# Patient Record
Sex: MALE | Race: WHITE | Employment: FULL TIME | URBAN - METROPOLITAN AREA
[De-identification: names, ages, dates, MRNs, and addresses within clinical notes are randomized per-mention and may not be internally consistent; named-entity substitution may affect disease eponyms.]

---

## 2017-02-08 PROCEDURE — 87081 CULTURE SCREEN ONLY: CPT | Performed by: FAMILY MEDICINE

## 2017-02-08 NOTE — PROGRESS NOTES
Haley Torres is a 46year old male here to discuss anxiety and cold symptoms. On lexapro for control. Pt has been stressed with work and school. Medicine is helping   Pt and wife happy  Pt commuting to [facility].    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 The patient is asked to return in 6 months or sooner if needed.

## 2017-02-16 ENCOUNTER — HOSPITAL ENCOUNTER (OUTPATIENT)
Dept: GENERAL RADIOLOGY | Facility: HOSPITAL | Age: 42
Discharge: HOME | End: 2017-02-16
Attending: FAMILY MEDICINE
Payer: COMMERCIAL

## 2017-02-16 ENCOUNTER — OFFICE VISIT (OUTPATIENT)
Dept: FAMILY MEDICINE CLINIC | Facility: CLINIC | Age: 42
End: 2017-02-16

## 2017-02-16 VITALS
DIASTOLIC BLOOD PRESSURE: 82 MMHG | OXYGEN SATURATION: 97 % | SYSTOLIC BLOOD PRESSURE: 122 MMHG | RESPIRATION RATE: 16 BRPM | HEART RATE: 71 BPM

## 2017-02-16 DIAGNOSIS — M25.511 ARTHRALGIA OF RIGHT ACROMIOCLAVICULAR JOINT: ICD-10-CM

## 2017-02-16 DIAGNOSIS — J18.9 PNEUMONIA OF LEFT LOWER LOBE DUE TO INFECTIOUS ORGANISM: Primary | ICD-10-CM

## 2017-02-16 DIAGNOSIS — J18.9 PNEUMONIA OF LEFT LOWER LOBE DUE TO INFECTIOUS ORGANISM: ICD-10-CM

## 2017-02-16 PROCEDURE — 71020 XR CHEST PA + LAT CHEST (CPT=71020): CPT

## 2017-02-16 PROCEDURE — 73050 X-RAY EXAM OF SHOULDERS: CPT

## 2017-02-16 PROCEDURE — 99214 OFFICE O/P EST MOD 30 MIN: CPT | Performed by: FAMILY MEDICINE

## 2017-02-16 RX ORDER — AZITHROMYCIN 250 MG/1
TABLET, FILM COATED ORAL
Qty: 6 TABLET | Refills: 0 | Status: SHIPPED | OUTPATIENT
Start: 2017-02-16 | End: 2017-03-24

## 2017-02-16 RX ORDER — CEFDINIR 300 MG/1
300 CAPSULE ORAL 2 TIMES DAILY
Qty: 20 CAPSULE | Refills: 0 | Status: SHIPPED | OUTPATIENT
Start: 2017-02-16 | End: 2017-02-26

## 2017-02-17 NOTE — PROGRESS NOTES
Gerry Love is a 38year old male here to discuss continued right shoulder pain and persistent respiratory symptoms    Pt has been sick for over one week  + c/c   No fever  Ear pain worse  + sick contacts  No otc meds     Pt has been seeing a chiro for ri lichenoid inflammation    • Eczema 2/08   • Multiple allergies    • E coli infection 2/06   • Food allergy      chicken, turkey, eggs causes throat to swell   • Nostril infection 8/06     right, with cellulitis, external redness, swelling and tenderness the plan. The patient is asked to return in 6 months or sooner if needed.

## 2017-02-24 ENCOUNTER — OFFICE VISIT (OUTPATIENT)
Dept: FAMILY MEDICINE CLINIC | Facility: CLINIC | Age: 42
End: 2017-02-24

## 2017-02-24 VITALS
DIASTOLIC BLOOD PRESSURE: 78 MMHG | RESPIRATION RATE: 18 BRPM | TEMPERATURE: 98 F | OXYGEN SATURATION: 96 % | HEART RATE: 71 BPM | HEIGHT: 68 IN | BODY MASS INDEX: 29.7 KG/M2 | WEIGHT: 196 LBS | SYSTOLIC BLOOD PRESSURE: 120 MMHG

## 2017-02-24 DIAGNOSIS — R06.2 WHEEZING: ICD-10-CM

## 2017-02-24 DIAGNOSIS — R05.9 COUGH: Primary | ICD-10-CM

## 2017-02-24 PROCEDURE — 99213 OFFICE O/P EST LOW 20 MIN: CPT | Performed by: PHYSICIAN ASSISTANT

## 2017-02-24 PROCEDURE — 94640 AIRWAY INHALATION TREATMENT: CPT | Performed by: PHYSICIAN ASSISTANT

## 2017-02-24 RX ORDER — PREDNISONE 20 MG/1
TABLET ORAL
Qty: 10 TABLET | Refills: 0 | Status: SHIPPED | OUTPATIENT
Start: 2017-02-24 | End: 2017-09-13

## 2017-02-24 RX ORDER — IPRATROPIUM BROMIDE AND ALBUTEROL SULFATE 2.5; .5 MG/3ML; MG/3ML
3 SOLUTION RESPIRATORY (INHALATION) ONCE
Status: COMPLETED | OUTPATIENT
Start: 2017-02-24 | End: 2017-02-24

## 2017-02-24 RX ORDER — FLUTICASONE PROPIONATE 50 MCG
1 SPRAY, SUSPENSION (ML) NASAL DAILY
Qty: 1 BOTTLE | Refills: 1 | Status: SHIPPED | OUTPATIENT
Start: 2017-02-24 | End: 2017-03-26

## 2017-02-24 RX ORDER — ALBUTEROL SULFATE 2.5 MG/3ML
2.5 SOLUTION RESPIRATORY (INHALATION) EVERY 4 HOURS PRN
Qty: 70 CONTAINER | Refills: 0 | Status: SHIPPED | OUTPATIENT
Start: 2017-02-24

## 2017-02-24 RX ADMIN — IPRATROPIUM BROMIDE AND ALBUTEROL SULFATE 3 ML: 2.5; .5 SOLUTION RESPIRATORY (INHALATION) at 08:42:00

## 2017-02-24 NOTE — PROGRESS NOTES
HPI:   Loni is a 38year old male who presents for follow up of diagnosed pneumonia. Seen 2/16/17; diagnosed with left lower lobe pneumonia and treated with zpak and cefdinir. Had chest xray which was unremarkable.  Pt states he only has taken the c 5/07     right, symptomatic recurrent, s/p lithotripsy   • Staph infection 8/20/09     of wound on the leg with cellulitis.  s/p I & D   • Urinary bladder stone 8/21/09     small stone   • Inguinal hernia 8/21/09     development of small right inguinal severiano improved  CV: normal S1S2, RRR without murmur     ASSESSMENT AND PLAN:   1. Cough  duoneb treatment given today in office-wheezing improved. Chest xray results reviewed and discussed. Prednisone 40 mg daily for 5 days. Take with food.   Albuterol nebulize

## 2017-03-24 ENCOUNTER — OFFICE VISIT (OUTPATIENT)
Dept: FAMILY MEDICINE CLINIC | Facility: CLINIC | Age: 42
End: 2017-03-24

## 2017-03-24 VITALS
WEIGHT: 192 LBS | HEART RATE: 76 BPM | TEMPERATURE: 99 F | BODY MASS INDEX: 29 KG/M2 | RESPIRATION RATE: 18 BRPM | DIASTOLIC BLOOD PRESSURE: 80 MMHG | SYSTOLIC BLOOD PRESSURE: 126 MMHG

## 2017-03-24 DIAGNOSIS — M25.511 CHRONIC RIGHT SHOULDER PAIN: ICD-10-CM

## 2017-03-24 DIAGNOSIS — G89.29 CHRONIC RIGHT SHOULDER PAIN: ICD-10-CM

## 2017-03-24 DIAGNOSIS — F41.9 ANXIETY: ICD-10-CM

## 2017-03-24 DIAGNOSIS — H65.115 RECURRENT SUBACUTE ALLERGIC OTITIS MEDIA OF LEFT EAR: Primary | ICD-10-CM

## 2017-03-24 PROCEDURE — 99214 OFFICE O/P EST MOD 30 MIN: CPT | Performed by: FAMILY MEDICINE

## 2017-03-24 RX ORDER — CEFDINIR 300 MG/1
300 CAPSULE ORAL 2 TIMES DAILY
Qty: 20 CAPSULE | Refills: 0 | Status: SHIPPED | OUTPATIENT
Start: 2017-03-24 | End: 2017-04-03

## 2017-03-24 RX ORDER — ALPRAZOLAM 0.5 MG/1
0.5 TABLET ORAL NIGHTLY PRN
Qty: 30 TABLET | Refills: 0 | Status: SHIPPED | OUTPATIENT
Start: 2017-03-24 | End: 2017-09-13

## 2017-03-24 RX ORDER — PREDNISONE 20 MG/1
40 TABLET ORAL DAILY
Qty: 8 TABLET | Refills: 0 | Status: SHIPPED | OUTPATIENT
Start: 2017-03-24 | End: 2017-03-28

## 2017-03-24 NOTE — PROGRESS NOTES
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 Dispense: 30 tablet; Refill: 0    3.  Chronic right shoulder pain    - PHYSICAL THERAPY - INTERNAL ([facility])    The patient indicates understanding of these issues and agrees to the pl

## 2017-04-14 RX ORDER — ESCITALOPRAM OXALATE 10 MG/1
TABLET ORAL
Qty: 90 TABLET | Refills: 0 | Status: SHIPPED | OUTPATIENT
Start: 2017-04-14 | End: 2017-07-02

## 2017-05-25 ENCOUNTER — CHARTING TRANS (OUTPATIENT)
Dept: OTHER | Age: 42
End: 2017-05-25

## 2017-05-25 ENCOUNTER — IMAGING SERVICES (OUTPATIENT)
Dept: OTHER | Age: 42
End: 2017-05-25

## 2017-05-25 ASSESSMENT — PAIN SCALES - GENERAL: PAINLEVEL_OUTOF10: 10

## 2017-07-03 RX ORDER — ESCITALOPRAM OXALATE 10 MG/1
TABLET ORAL
Qty: 90 TABLET | Refills: 0 | Status: SHIPPED | OUTPATIENT
Start: 2017-07-03 | End: 2017-09-13

## 2017-09-13 ENCOUNTER — OFFICE VISIT (OUTPATIENT)
Dept: FAMILY MEDICINE CLINIC | Facility: CLINIC | Age: 42
End: 2017-09-13

## 2017-09-13 VITALS
TEMPERATURE: 98 F | DIASTOLIC BLOOD PRESSURE: 80 MMHG | SYSTOLIC BLOOD PRESSURE: 118 MMHG | WEIGHT: 196 LBS | RESPIRATION RATE: 18 BRPM | HEIGHT: 68 IN | OXYGEN SATURATION: 98 % | BODY MASS INDEX: 29.7 KG/M2 | HEART RATE: 80 BPM

## 2017-09-13 DIAGNOSIS — Z91.018 NUT ALLERGY: ICD-10-CM

## 2017-09-13 DIAGNOSIS — F41.9 ANXIETY: ICD-10-CM

## 2017-09-13 DIAGNOSIS — Z00.00 ROUTINE MEDICAL EXAM: Primary | ICD-10-CM

## 2017-09-13 DIAGNOSIS — Z00.00 LABORATORY EXAM ORDERED AS PART OF ROUTINE GENERAL MEDICAL EXAMINATION: ICD-10-CM

## 2017-09-13 PROCEDURE — 99396 PREV VISIT EST AGE 40-64: CPT | Performed by: PHYSICIAN ASSISTANT

## 2017-09-13 RX ORDER — ALBUTEROL SULFATE 90 UG/1
1 AEROSOL, METERED RESPIRATORY (INHALATION) EVERY 6 HOURS PRN
Qty: 1 INHALER | Refills: 1 | Status: SHIPPED | OUTPATIENT
Start: 2017-09-13 | End: 2017-12-07

## 2017-09-13 RX ORDER — ESCITALOPRAM OXALATE 10 MG/1
10 TABLET ORAL
Qty: 90 TABLET | Refills: 1 | Status: SHIPPED | OUTPATIENT
Start: 2017-09-13 | End: 2018-01-22

## 2017-09-13 RX ORDER — ALPRAZOLAM 0.5 MG/1
0.5 TABLET ORAL NIGHTLY PRN
Qty: 30 TABLET | Refills: 0 | Status: SHIPPED | OUTPATIENT
Start: 2017-09-13 | End: 2017-12-07

## 2017-09-13 RX ORDER — EPINEPHRINE 0.3 MG/.3ML
0.3 INJECTION SUBCUTANEOUS AS NEEDED
Qty: 1 EACH | Refills: 1 | Status: SHIPPED | OUTPATIENT
Start: 2017-09-13 | End: 2018-12-15

## 2017-09-13 NOTE — PROGRESS NOTES
Carlos Maki is a 38year old male who presents for a complete physical exam.   HPI:   Pt complains of needing physical.   Pt is not fasting today for lab work. Also here to follow up on lexapro. Tolerating well without side effects.  Symptoms are stabl development of small right inguinal hernia   • Multiple allergies    • Nephrolithiasis 5/07    right, symptomatic recurrent, s/p lithotripsy   • Nostril infection 8/06    right, with cellulitis, external redness, swelling and tenderness   • Other specified denies chest pain, pressure or palpitations  GI: denies abdominal pain, denies heartburn, denies chronic diarrhea or constipation  : denies nocturia or changes in stream, denies erectile dysfunction  MS: denies back pain, arthralgias or myalgias  NEURO: T4; Future  - VITAMIN D, 25-HYDROXY; Future    3. Anxiety  Refill escitalopram and alprazolam  Symptoms are stable  - escitalopram 10 MG Oral Tab; Take 1 tablet (10 mg total) by mouth once daily. Dispense: 90 tablet;  Refill: 1  - ALPRAZolam (XANAX) 0.5 MG

## 2017-10-12 RX ORDER — ESCITALOPRAM OXALATE 10 MG/1
TABLET ORAL
Qty: 90 TABLET | Refills: 0 | OUTPATIENT
Start: 2017-10-12

## 2017-10-26 ENCOUNTER — TELEPHONE (OUTPATIENT)
Dept: FAMILY MEDICINE CLINIC | Facility: CLINIC | Age: 42
End: 2017-10-26

## 2017-10-26 NOTE — TELEPHONE ENCOUNTER
LMTCB for patient to schedule appointment with LE to review lab results. Copy of labs sent to scan and copy put in LE appointment folder.

## 2017-12-07 ENCOUNTER — OFFICE VISIT (OUTPATIENT)
Dept: FAMILY MEDICINE CLINIC | Facility: CLINIC | Age: 42
End: 2017-12-07

## 2017-12-07 VITALS
BODY MASS INDEX: 29.7 KG/M2 | SYSTOLIC BLOOD PRESSURE: 122 MMHG | WEIGHT: 196 LBS | DIASTOLIC BLOOD PRESSURE: 68 MMHG | HEART RATE: 74 BPM | TEMPERATURE: 99 F | HEIGHT: 68 IN | RESPIRATION RATE: 18 BRPM | OXYGEN SATURATION: 98 %

## 2017-12-07 DIAGNOSIS — E78.00 ELEVATED CHOLESTEROL: ICD-10-CM

## 2017-12-07 DIAGNOSIS — J45.20 MILD INTERMITTENT ASTHMA WITHOUT COMPLICATION: ICD-10-CM

## 2017-12-07 DIAGNOSIS — G47.09 OTHER INSOMNIA: Primary | ICD-10-CM

## 2017-12-07 DIAGNOSIS — F41.9 ANXIETY: ICD-10-CM

## 2017-12-07 PROCEDURE — 99214 OFFICE O/P EST MOD 30 MIN: CPT | Performed by: FAMILY MEDICINE

## 2017-12-07 PROCEDURE — 90471 IMMUNIZATION ADMIN: CPT | Performed by: FAMILY MEDICINE

## 2017-12-07 PROCEDURE — 90686 IIV4 VACC NO PRSV 0.5 ML IM: CPT | Performed by: FAMILY MEDICINE

## 2017-12-07 RX ORDER — TRAZODONE HYDROCHLORIDE 50 MG/1
50 TABLET ORAL NIGHTLY
Qty: 30 TABLET | Refills: 0 | Status: SHIPPED | OUTPATIENT
Start: 2017-12-07 | End: 2018-02-15

## 2017-12-07 RX ORDER — ALPRAZOLAM 0.5 MG/1
0.5 TABLET ORAL NIGHTLY PRN
Qty: 30 TABLET | Refills: 0 | Status: SHIPPED | OUTPATIENT
Start: 2017-12-07 | End: 2018-02-15

## 2017-12-07 RX ORDER — ALBUTEROL SULFATE 90 UG/1
1 AEROSOL, METERED RESPIRATORY (INHALATION) EVERY 6 HOURS PRN
Qty: 1 INHALER | Refills: 1 | Status: SHIPPED | OUTPATIENT
Start: 2017-12-07 | End: 2018-12-15

## 2017-12-07 NOTE — PROGRESS NOTES
Spencer Sepulveda is a 38year old male here to discuss labs and anxiety. HPI:     Pt will use xanax to help with sleep; pt has a cpap. Did discuss long term side effects of benzos  Willing to try something new.     Cholesterol is elevated; pt has not been wor symptomatic recurrent, s/p lithotripsy   • Nostril infection 8/06    right, with cellulitis, external redness, swelling and tenderness   • Other specified conditions of the tongue 9/07    s/p right lateral tongue partial glossectomy for epithelial dysplasi 30 tablet; Refill: 0    3. Mild intermittent asthma without complication    - Albuterol Sulfate HFA (PROAIR HFA) 108 (90 Base) MCG/ACT Inhalation Aero Soln; Inhale 1 puff into the lungs every 6 (six) hours as needed for Wheezing. Dispense: 1 Inhaler;  Refi

## 2018-01-22 DIAGNOSIS — F41.9 ANXIETY: ICD-10-CM

## 2018-01-22 RX ORDER — ESCITALOPRAM OXALATE 10 MG/1
10 TABLET ORAL
Qty: 90 TABLET | Refills: 0 | Status: SHIPPED | OUTPATIENT
Start: 2018-01-22 | End: 2018-04-15

## 2018-02-14 NOTE — PROGRESS NOTES
HPI:   Loni is a 41year old male who c/o medication follow up. Seen by Dr Silvia Hurtado 12/2017; trazodone was prescribed along with refills of alprazolam and escitalopram. Needs refills on his medications.  Traveling to [facility], and [facility] Take 1 tablet (0.5 mg total) by mouth nightly as needed for Sleep or Anxiety. Dispense: 30 tablet; Refill: 0    2. Other insomnia  Trazodone for sleep. Do not mix alprazolam and trazodone.   - TraZODone HCl 50 MG Oral Tab;  Take 1 tablet (50 mg total) by

## 2018-03-19 DIAGNOSIS — G47.09 OTHER INSOMNIA: ICD-10-CM

## 2018-03-20 RX ORDER — TRAZODONE HYDROCHLORIDE 50 MG/1
TABLET ORAL
Qty: 30 TABLET | Refills: 0 | Status: SHIPPED | OUTPATIENT
Start: 2018-03-20 | End: 2018-04-20

## 2018-04-15 DIAGNOSIS — F41.9 ANXIETY: ICD-10-CM

## 2018-04-16 RX ORDER — ESCITALOPRAM OXALATE 10 MG/1
TABLET ORAL
Qty: 30 TABLET | Refills: 0 | Status: SHIPPED | OUTPATIENT
Start: 2018-04-16 | End: 2018-05-18

## 2018-04-20 DIAGNOSIS — G47.09 OTHER INSOMNIA: ICD-10-CM

## 2018-04-20 DIAGNOSIS — F41.9 ANXIETY: ICD-10-CM

## 2018-04-23 RX ORDER — TRAZODONE HYDROCHLORIDE 50 MG/1
TABLET ORAL
Qty: 30 TABLET | Refills: 0 | Status: SHIPPED | OUTPATIENT
Start: 2018-04-23 | End: 2018-12-15

## 2018-04-23 RX ORDER — ALPRAZOLAM 0.5 MG/1
TABLET ORAL
Qty: 30 TABLET | Refills: 0 | Status: SHIPPED
Start: 2018-04-23 | End: 2018-06-08

## 2018-05-18 DIAGNOSIS — F41.9 ANXIETY: ICD-10-CM

## 2018-05-18 RX ORDER — ESCITALOPRAM OXALATE 5 MG/1
TABLET ORAL
Qty: 90 TABLET | Refills: 0 | Status: SHIPPED | OUTPATIENT
Start: 2018-05-18 | End: 2018-06-08

## 2018-05-20 RX ORDER — ESCITALOPRAM OXALATE 10 MG/1
TABLET ORAL
Qty: 30 TABLET | Refills: 0 | Status: SHIPPED | OUTPATIENT
Start: 2018-05-20 | End: 2018-06-08

## 2018-06-07 NOTE — PROGRESS NOTES
HPI:   Loni is a 41year old male who presents for sinus symptoms for  4  days. Patient reports sore throat, congestion, ear pain, denies fever, denies sinus pain. Has taken ibuprofen over the counter, last dose 8 AM today.  Ears were plugged flying Diarrhea 9/11   • E coli infection 2/06   • Eczema 2/08   • EE (eosinophilic esophagitis) 7/62/8656   • Enlargement of lymph nodes 9/11/09   • Food allergy     chicken, turkey, eggs causes throat to swell   • Gastroenteritis 20/06   • H/O lymph node biopsy +mucosal inflammation and erythema, posterior pharynx with mild erythema and cobblestoning, uvula midline, no trismus, no drooling, no stridor, no sinus tenderness  NECK: supple, +anterior cervical adenopathy  LUNGS: CTA, easy breathing, no cough  CV: norm

## 2018-06-08 ENCOUNTER — OFFICE VISIT (OUTPATIENT)
Dept: FAMILY MEDICINE CLINIC | Facility: CLINIC | Age: 43
End: 2018-06-08

## 2018-06-08 VITALS
OXYGEN SATURATION: 99 % | TEMPERATURE: 98 F | RESPIRATION RATE: 16 BRPM | HEART RATE: 80 BPM | DIASTOLIC BLOOD PRESSURE: 72 MMHG | SYSTOLIC BLOOD PRESSURE: 128 MMHG

## 2018-06-08 DIAGNOSIS — J02.9 SORE THROAT: ICD-10-CM

## 2018-06-08 DIAGNOSIS — R09.82 POST-NASAL DRIP: ICD-10-CM

## 2018-06-08 DIAGNOSIS — H66.92 ACUTE INFECTION OF LEFT EAR: Primary | ICD-10-CM

## 2018-06-08 DIAGNOSIS — F41.9 ANXIETY: ICD-10-CM

## 2018-06-08 PROCEDURE — 99213 OFFICE O/P EST LOW 20 MIN: CPT | Performed by: PHYSICIAN ASSISTANT

## 2018-06-08 PROCEDURE — 87880 STREP A ASSAY W/OPTIC: CPT | Performed by: PHYSICIAN ASSISTANT

## 2018-06-08 RX ORDER — ESCITALOPRAM OXALATE 10 MG/1
10 TABLET ORAL
Qty: 30 TABLET | Refills: 2 | Status: SHIPPED | OUTPATIENT
Start: 2018-06-08 | End: 2018-08-11

## 2018-06-08 RX ORDER — ESCITALOPRAM OXALATE 5 MG/1
TABLET ORAL
Qty: 30 TABLET | Refills: 2 | Status: SHIPPED | OUTPATIENT
Start: 2018-06-08 | End: 2018-08-11

## 2018-06-08 RX ORDER — AMOXICILLIN AND CLAVULANATE POTASSIUM 875; 125 MG/1; MG/1
1 TABLET, FILM COATED ORAL 2 TIMES DAILY
Qty: 20 TABLET | Refills: 0 | Status: SHIPPED | OUTPATIENT
Start: 2018-06-08 | End: 2018-06-18

## 2018-06-08 RX ORDER — ALPRAZOLAM 0.5 MG/1
TABLET ORAL
Qty: 30 TABLET | Refills: 0 | Status: SHIPPED | OUTPATIENT
Start: 2018-06-08 | End: 2018-08-11

## 2018-08-11 DIAGNOSIS — F41.9 ANXIETY: ICD-10-CM

## 2018-08-13 RX ORDER — ALPRAZOLAM 0.5 MG/1
TABLET ORAL
Qty: 30 TABLET | Refills: 0 | Status: SHIPPED | OUTPATIENT
Start: 2018-08-13 | End: 2018-12-15

## 2018-08-13 RX ORDER — ESCITALOPRAM OXALATE 5 MG/1
TABLET ORAL
Qty: 30 TABLET | Refills: 2 | Status: SHIPPED | OUTPATIENT
Start: 2018-08-13 | End: 2018-12-15

## 2018-08-13 RX ORDER — ESCITALOPRAM OXALATE 10 MG/1
10 TABLET ORAL
Qty: 30 TABLET | Refills: 2 | Status: SHIPPED | OUTPATIENT
Start: 2018-08-13 | End: 2018-12-15

## 2018-08-13 NOTE — TELEPHONE ENCOUNTER
From: Loni  Sent: 8/11/2018 8:28 AM CDT  Subject: Medication Renewal Request    Loni would like a refill of the following medications:     ALPRAZolam 0.5 MG Oral Tab Loreen Kayser, PA]     escitalopram 10 MG Oral Tab [Lisa Smart P

## 2018-11-03 VITALS
BODY MASS INDEX: 28.04 KG/M2 | HEART RATE: 70 BPM | HEIGHT: 68 IN | DIASTOLIC BLOOD PRESSURE: 78 MMHG | RESPIRATION RATE: 16 BRPM | TEMPERATURE: 98.9 F | WEIGHT: 185 LBS | SYSTOLIC BLOOD PRESSURE: 118 MMHG

## 2018-12-15 ENCOUNTER — TELEPHONE (OUTPATIENT)
Dept: FAMILY MEDICINE CLINIC | Facility: CLINIC | Age: 43
End: 2018-12-15

## 2018-12-15 DIAGNOSIS — G47.09 OTHER INSOMNIA: ICD-10-CM

## 2018-12-15 DIAGNOSIS — Z91.018 NUT ALLERGY: ICD-10-CM

## 2018-12-15 DIAGNOSIS — J45.20 MILD INTERMITTENT ASTHMA WITHOUT COMPLICATION: ICD-10-CM

## 2018-12-15 DIAGNOSIS — K20.0 EE (EOSINOPHILIC ESOPHAGITIS): ICD-10-CM

## 2018-12-15 DIAGNOSIS — F41.9 ANXIETY: ICD-10-CM

## 2018-12-17 RX ORDER — ESCITALOPRAM OXALATE 10 MG/1
TABLET ORAL
Qty: 30 TABLET | Refills: 1 | Status: SHIPPED | OUTPATIENT
Start: 2018-12-17 | End: 2019-10-03 | Stop reason: ALTCHOICE

## 2018-12-17 RX ORDER — ESCITALOPRAM OXALATE 5 MG/1
TABLET ORAL
Qty: 30 TABLET | Refills: 1 | Status: SHIPPED | OUTPATIENT
Start: 2018-12-17 | End: 2019-10-03 | Stop reason: ALTCHOICE

## 2018-12-17 RX ORDER — OMEPRAZOLE 20 MG/1
CAPSULE, DELAYED RELEASE ORAL
Qty: 90 CAPSULE | Refills: 0 | Status: SHIPPED | OUTPATIENT
Start: 2018-12-17 | End: 2020-01-06

## 2018-12-17 RX ORDER — TRAZODONE HYDROCHLORIDE 50 MG/1
50 TABLET ORAL NIGHTLY PRN
Qty: 30 TABLET | Refills: 0 | Status: SHIPPED | OUTPATIENT
Start: 2018-12-17 | End: 2020-01-06

## 2018-12-17 RX ORDER — ALPRAZOLAM 0.5 MG/1
TABLET ORAL
Qty: 30 TABLET | Refills: 0 | Status: SHIPPED | OUTPATIENT
Start: 2018-12-17 | End: 2020-01-06

## 2018-12-17 RX ORDER — EPINEPHRINE 0.3 MG/.3ML
0.3 INJECTION SUBCUTANEOUS AS NEEDED
Qty: 1 EACH | Refills: 1 | Status: SHIPPED | OUTPATIENT
Start: 2018-12-17 | End: 2021-10-06

## 2018-12-18 RX ORDER — ALBUTEROL SULFATE 90 UG/1
1 AEROSOL, METERED RESPIRATORY (INHALATION) EVERY 6 HOURS PRN
Qty: 1 INHALER | Refills: 0 | Status: SHIPPED | OUTPATIENT
Start: 2018-12-18 | End: 2019-01-12

## 2018-12-18 NOTE — TELEPHONE ENCOUNTER
Rx Request  Albuterol Sulfate HFA (PROAIR HFA) 108 (90 Base) MCG/ACT Inhalation Aero Soln    Disp:        1            R: 1      Associated Dx:Mild intermittent asthma without complication    Last Visit: 06/08/2018    Last Refilled:12/07/2017

## 2019-01-12 ENCOUNTER — TELEPHONE (OUTPATIENT)
Dept: FAMILY MEDICINE CLINIC | Facility: CLINIC | Age: 44
End: 2019-01-12

## 2019-01-12 DIAGNOSIS — J45.20 MILD INTERMITTENT ASTHMA WITHOUT COMPLICATION: ICD-10-CM

## 2019-01-12 RX ORDER — ALBUTEROL SULFATE 90 UG/1
1 AEROSOL, METERED RESPIRATORY (INHALATION) EVERY 6 HOURS PRN
Qty: 1 INHALER | Refills: 0 | Status: SHIPPED | OUTPATIENT
Start: 2019-01-12 | End: 2020-02-27

## 2019-01-12 NOTE — TELEPHONE ENCOUNTER
Pt is about 3 hours away from his home and will be gone for 2 days, pt forgot his albuterol inhaler at home and is requesting to please prescribe an additional one to a pharmacy close to him, pt will call back with the pharmacy information.  Please call and

## 2019-01-12 NOTE — TELEPHONE ENCOUNTER
Pt called back to provide the pharmacy information: [facility] [phone]  # [phone] fax # [phone].

## 2019-01-12 NOTE — TELEPHONE ENCOUNTER
See previous message pt left inhaler at home and out of town. Albuterol was just refilled 12/18/18. Script sent to [email or web address] in [place] [id number].

## 2019-03-19 ENCOUNTER — TELEPHONE (OUTPATIENT)
Dept: FAMILY MEDICINE CLINIC | Facility: CLINIC | Age: 44
End: 2019-03-19

## 2019-03-19 RX ORDER — OSELTAMIVIR PHOSPHATE 75 MG/1
75 CAPSULE ORAL DAILY
Qty: 10 CAPSULE | Refills: 0 | Status: SHIPPED | OUTPATIENT
Start: 2019-03-19 | End: 2019-09-09 | Stop reason: ALTCHOICE

## 2019-09-09 ENCOUNTER — HOSPITAL ENCOUNTER (OUTPATIENT)
Dept: GENERAL RADIOLOGY | Age: 44
Discharge: HOME | End: 2019-09-09
Attending: PHYSICIAN ASSISTANT
Payer: COMMERCIAL

## 2019-09-09 ENCOUNTER — OFFICE VISIT (OUTPATIENT)
Dept: FAMILY MEDICINE CLINIC | Facility: CLINIC | Age: 44
End: 2019-09-09
Payer: COMMERCIAL

## 2019-09-09 VITALS
BODY MASS INDEX: 27.13 KG/M2 | OXYGEN SATURATION: 99 % | DIASTOLIC BLOOD PRESSURE: 70 MMHG | HEIGHT: 68 IN | HEART RATE: 77 BPM | RESPIRATION RATE: 16 BRPM | WEIGHT: 179 LBS | TEMPERATURE: 98 F | SYSTOLIC BLOOD PRESSURE: 106 MMHG

## 2019-09-09 DIAGNOSIS — R21 RASH: ICD-10-CM

## 2019-09-09 DIAGNOSIS — S69.92XA HAND INJURY, LEFT, INITIAL ENCOUNTER: ICD-10-CM

## 2019-09-09 DIAGNOSIS — S69.92XA HAND INJURY, LEFT, INITIAL ENCOUNTER: Primary | ICD-10-CM

## 2019-09-09 PROCEDURE — 73130 X-RAY EXAM OF HAND: CPT | Performed by: PHYSICIAN ASSISTANT

## 2019-09-09 PROCEDURE — 99213 OFFICE O/P EST LOW 20 MIN: CPT | Performed by: PHYSICIAN ASSISTANT

## 2019-09-09 RX ORDER — TRIAMCINOLONE ACETONIDE 5 MG/G
CREAM TOPICAL
Qty: 30 G | Refills: 2 | Status: SHIPPED | OUTPATIENT
Start: 2019-09-09 | End: 2020-01-06

## 2019-09-09 NOTE — PROGRESS NOTES
HPI:    Patient ID: Loni is a 45year old male. HPI   Patient who is right hand dominant presents today for evaluation of left hand pain.  States yesterday he was putting the pool cover on and his left hand slipped as it was rotating around hitti albuterol sulfate (2.5 MG/3ML) 0.083% Inhalation Nebu Soln Take 3 mL (2.5 mg total) by nebulization every 4 (four) hours as needed for Wheezing.  Disp: 70 Container Rfl: 0   TraZODone HCl 50 MG Oral Tab Take 1 tablet (50 mg total) by mouth nightly as need through fourth fingers after injury that occurred yesterday. Exam findings as documented. Given the extent of bony tenderness and decreased range of motion will check x-ray to rule out fracture. Refer to Dr. Gary Swan if positive for fracture.   Wound

## 2019-10-03 ENCOUNTER — OFFICE VISIT (OUTPATIENT)
Dept: FAMILY MEDICINE CLINIC | Facility: CLINIC | Age: 44
End: 2019-10-03
Payer: COMMERCIAL

## 2019-10-03 VITALS
TEMPERATURE: 98 F | RESPIRATION RATE: 18 BRPM | SYSTOLIC BLOOD PRESSURE: 112 MMHG | WEIGHT: 173 LBS | BODY MASS INDEX: 26.22 KG/M2 | HEART RATE: 76 BPM | OXYGEN SATURATION: 98 % | DIASTOLIC BLOOD PRESSURE: 66 MMHG | HEIGHT: 68 IN

## 2019-10-03 DIAGNOSIS — L30.9 DERMATITIS: Primary | ICD-10-CM

## 2019-10-03 PROCEDURE — 99213 OFFICE O/P EST LOW 20 MIN: CPT | Performed by: INTERNAL MEDICINE

## 2019-10-03 RX ORDER — CLOTRIMAZOLE AND BETAMETHASONE DIPROPIONATE 10; .64 MG/G; MG/G
CREAM TOPICAL
Qty: 45 G | Refills: 0 | Status: SHIPPED | OUTPATIENT
Start: 2019-10-03 | End: 2020-01-06

## 2019-10-03 NOTE — PROGRESS NOTES
Trsiten Prasad is a 45year old male. HPI:   Here with recurrent rash on arms. Seen last month and treated with triamcinolone, pt states it never completely went away. Got worse when he returned to the gym and uses a machine where he rests his forearms. Multiple allergies    • Nephrolithiasis 5/07    right, symptomatic recurrent, s/p lithotripsy   • Nostril infection 8/06    right, with cellulitis, external redness, swelling and tenderness   • Other specified conditions of the tongue 9/07    s/p right lat

## 2020-01-03 ENCOUNTER — TELEPHONE (OUTPATIENT)
Dept: SCHEDULING | Age: 45
End: 2020-01-03

## 2020-01-03 ENCOUNTER — OFFICE VISIT (OUTPATIENT)
Dept: FAMILY MEDICINE CLINIC | Facility: CLINIC | Age: 45
End: 2020-01-03
Payer: COMMERCIAL

## 2020-01-03 VITALS
HEART RATE: 68 BPM | TEMPERATURE: 98 F | HEIGHT: 68 IN | BODY MASS INDEX: 25.01 KG/M2 | OXYGEN SATURATION: 99 % | WEIGHT: 165 LBS | DIASTOLIC BLOOD PRESSURE: 70 MMHG | SYSTOLIC BLOOD PRESSURE: 110 MMHG

## 2020-01-03 DIAGNOSIS — J01.00 ACUTE NON-RECURRENT MAXILLARY SINUSITIS: Primary | ICD-10-CM

## 2020-01-03 PROCEDURE — 99213 OFFICE O/P EST LOW 20 MIN: CPT | Performed by: FAMILY MEDICINE

## 2020-01-03 RX ORDER — BUPROPION HYDROCHLORIDE 200 MG/1
300 TABLET, EXTENDED RELEASE ORAL 2 TIMES DAILY
COMMUNITY
End: 2020-12-22

## 2020-01-03 RX ORDER — AMOXICILLIN AND CLAVULANATE POTASSIUM 875; 125 MG/1; MG/1
1 TABLET, FILM COATED ORAL 2 TIMES DAILY
Qty: 20 TABLET | Refills: 0 | Status: SHIPPED | OUTPATIENT
Start: 2020-01-03 | End: 2020-01-13

## 2020-01-03 NOTE — PATIENT INSTRUCTIONS
Take antibiotics with food and plenty of water. Eat yogurt or take probiotic daily. Make sure to finish the entire antibiotic treatment.   Increase fluids and rest.     Use OTC meds for comfort as needed--  Use Benadryl at bedtime to reduce drainage and

## 2020-01-04 NOTE — PROGRESS NOTES
CHIEF COMPLAINT:   Patient presents with:  Cold: congestion, sore throat, cough, fatigue x 5 days. HPI:   Loni is a 45year old male who presents for sinus congestion for  5  days. Symptoms have been worsening since onset.  Sinus congestion/pa MG/3ML) 0.083% Inhalation Nebu Soln Take 3 mL (2.5 mg total) by nebulization every 4 (four) hours as needed for Wheezing.  (Patient not taking: Reported on 1/3/2020 ) 70 Container 0      Past Medical History:   Diagnosis Date   • Acute nasopharyngitis (comm cancer) Other    • Other (Other) Father 76        hypothyroidism      Social History    Tobacco Use      Smoking status: Never Smoker      Smokeless tobacco: Never Used    Alcohol use: Yes      Comment: occasional    Drug use: No        REVIEW OF SYSTEMS: days.           Risks, benefits, side effects of medication addressed and explained. Patient Instructions   Take antibiotics with food and plenty of water. Eat yogurt or take probiotic daily. Make sure to finish the entire antibiotic treatment.   Inc

## 2020-01-06 ENCOUNTER — ANESTHESIA (OUTPATIENT)
Dept: SURGERY | Facility: HOSPITAL | Age: 45
End: 2020-01-06
Payer: COMMERCIAL

## 2020-01-06 ENCOUNTER — HOSPITAL ENCOUNTER (EMERGENCY)
Facility: HOSPITAL | Age: 45
Discharge: HOME | End: 2020-01-06
Attending: EMERGENCY MEDICINE
Payer: COMMERCIAL

## 2020-01-06 ENCOUNTER — ANESTHESIA EVENT (OUTPATIENT)
Dept: SURGERY | Facility: HOSPITAL | Age: 45
End: 2020-01-06
Payer: COMMERCIAL

## 2020-01-06 VITALS
BODY MASS INDEX: 25.31 KG/M2 | OXYGEN SATURATION: 93 % | TEMPERATURE: 98 F | RESPIRATION RATE: 21 BRPM | HEIGHT: 68 IN | DIASTOLIC BLOOD PRESSURE: 65 MMHG | HEART RATE: 72 BPM | WEIGHT: 167 LBS | SYSTOLIC BLOOD PRESSURE: 111 MMHG

## 2020-01-06 DIAGNOSIS — K22.2 ESOPHAGEAL OBSTRUCTION DUE TO FOOD IMPACTION: Primary | ICD-10-CM

## 2020-01-06 DIAGNOSIS — T18.9XXA FOREIGN BODY ALIMENTARY TRACT: ICD-10-CM

## 2020-01-06 DIAGNOSIS — T18.128A ESOPHAGEAL OBSTRUCTION DUE TO FOOD IMPACTION: Primary | ICD-10-CM
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
## 2020-01-06 PROCEDURE — 96375 TX/PRO/DX INJ NEW DRUG ADDON: CPT

## 2020-01-06 PROCEDURE — 0DB28ZX EXCISION OF MIDDLE ESOPHAGUS, VIA NATURAL OR ARTIFICIAL OPENING ENDOSCOPIC, DIAGNOSTIC: ICD-10-PCS | Performed by: INTERNAL MEDICINE

## 2020-01-06 PROCEDURE — 99285 EMERGENCY DEPT VISIT HI MDM: CPT

## 2020-01-06 PROCEDURE — 88305 TISSUE EXAM BY PATHOLOGIST: CPT | Performed by: INTERNAL MEDICINE

## 2020-01-06 PROCEDURE — 96374 THER/PROPH/DIAG INJ IV PUSH: CPT

## 2020-01-06 RX ORDER — NALOXONE HYDROCHLORIDE 0.4 MG/ML
80 INJECTION, SOLUTION INTRAMUSCULAR; INTRAVENOUS; SUBCUTANEOUS AS NEEDED
Status: DISCONTINUED | OUTPATIENT
Start: 2020-01-06 | End: 2020-01-07

## 2020-01-06 RX ORDER — SODIUM CHLORIDE, SODIUM LACTATE, POTASSIUM CHLORIDE, CALCIUM CHLORIDE 600; 310; 30; 20 MG/100ML; MG/100ML; MG/100ML; MG/100ML
INJECTION, SOLUTION INTRAVENOUS CONTINUOUS PRN
Status: DISCONTINUED | OUTPATIENT
Start: 2020-01-06 | End: 2020-01-06 | Stop reason: SURG

## 2020-01-06 RX ORDER — LIDOCAINE HYDROCHLORIDE 10 MG/ML
INJECTION, SOLUTION EPIDURAL; INFILTRATION; INTRACAUDAL; PERINEURAL AS NEEDED
Status: DISCONTINUED | OUTPATIENT
Start: 2020-01-06 | End: 2020-01-06 | Stop reason: SURG

## 2020-01-06 RX ORDER — SODIUM CHLORIDE, SODIUM LACTATE, POTASSIUM CHLORIDE, CALCIUM CHLORIDE 600; 310; 30; 20 MG/100ML; MG/100ML; MG/100ML; MG/100ML
INJECTION, SOLUTION INTRAVENOUS CONTINUOUS
Status: DISCONTINUED | OUTPATIENT
Start: 2020-01-06 | End: 2020-01-07

## 2020-01-06 RX ORDER — DEXAMETHASONE SODIUM PHOSPHATE 4 MG/ML
VIAL (ML) INJECTION AS NEEDED
Status: DISCONTINUED | OUTPATIENT
Start: 2020-01-06 | End: 2020-01-06 | Stop reason: SURG

## 2020-01-06 RX ORDER — ONDANSETRON 2 MG/ML
INJECTION INTRAMUSCULAR; INTRAVENOUS AS NEEDED
Status: DISCONTINUED | OUTPATIENT
Start: 2020-01-06 | End: 2020-01-06 | Stop reason: SURG

## 2020-01-06 RX ORDER — HYDROMORPHONE HYDROCHLORIDE 1 MG/ML
0.4 INJECTION, SOLUTION INTRAMUSCULAR; INTRAVENOUS; SUBCUTANEOUS EVERY 5 MIN PRN
Status: DISCONTINUED | OUTPATIENT
Start: 2020-01-06 | End: 2020-01-07

## 2020-01-06 RX ORDER — ONDANSETRON 2 MG/ML
4 INJECTION INTRAMUSCULAR; INTRAVENOUS ONCE
Status: COMPLETED | OUTPATIENT
Start: 2020-01-06 | End: 2020-01-06

## 2020-01-06 RX ADMIN — ONDANSETRON 4 MG: 2 INJECTION INTRAMUSCULAR; INTRAVENOUS at 22:39:00

## 2020-01-06 RX ADMIN — SODIUM CHLORIDE, SODIUM LACTATE, POTASSIUM CHLORIDE, CALCIUM CHLORIDE: 600; 310; 30; 20 INJECTION, SOLUTION INTRAVENOUS at 22:26:00

## 2020-01-06 RX ADMIN — LIDOCAINE HYDROCHLORIDE 50 MG: 10 INJECTION, SOLUTION EPIDURAL; INFILTRATION; INTRACAUDAL; PERINEURAL at 22:30:00

## 2020-01-06 RX ADMIN — DEXAMETHASONE SODIUM PHOSPHATE 4 MG: 4 MG/ML VIAL (ML) INJECTION at 22:39:00

## 2020-01-07 NOTE — ANESTHESIA PREPROCEDURE EVALUATION
PRE-OP EVALUATION    Patient Name: Loni    Pre-op Diagnosis: Foreign body alimentary tract [T18. 9XXA]    Procedure(s):  esophagogatroduodenoscopt    Surgeon(s) and Role:     Nhan Ochoa MD - Primary    Pre-op vitals reviewed. Temp: 97. Past Surgical History:   Procedure Laterality Date   • BIOPSY/REMOVAL, LYMPH NODE(S)  9/09    retroperitoneal messenteric nodes, Negative   • HERNIA SURGERY     • NECK/CHEST PROCEDURE UNLISTED  4 y/o     removal of neck growth   • ORAL SURGERY

## 2020-01-07 NOTE — OPERATIVE REPORT
Operative Report-Esophagogastroduodenoscopy with biopsy      PREOPERATIVE DIAGNOSIS/INDICATION:  1. Esophageal food impaction  2. Eosinophilic esophagitis  POSTOPERTATIVE DIAGNOSIS:  1.  Patent esophagus with no retained food bolus food allergens  - OTC Prilosec daily x 2 weeks  - Soft food x 48 hours  - OV with NP in 2-3 weeks .   CC Report:     Davon Akhtar  1/6/2020  10:43 PM

## 2020-01-07 NOTE — ED NOTES
Report given to [facility] on the patient, she is in transport to hospital, will still be at least 30 minutes.

## 2020-01-07 NOTE — ED INITIAL ASSESSMENT (HPI)
Patient states he ate something at lunch that he was potentially allergic to that gave him stomach cramps. Then he went home and ate pork for dinner about an hour ago and hasn't been able to tolerate swallowing liquids. Patient vomited at triage.   Jessee

## 2020-01-07 NOTE — ANESTHESIA POSTPROCEDURE EVALUATION
[address] Patient Status:  Emergency   Age/Gender 45year old male MRN [id number]   Location [address] [place] [address] Attending Ron Palomino, [address] [facility] Se Day # 0 PCP Noni Sena DO       Anesthesia Post-op Note    Procedu

## 2020-01-07 NOTE — CONSULTS
[facility]                       Gastroenterology [address] [facility] Gastroenterology    Yin Denny Patient Status:  Emergency    1975 MRN [id number]   Location [address] [place] [address] Attending Ina Nelson swelling/pain   • Staph infection 8/20/09    of wound on the leg with cellulitis.  s/p I & D   • Streptococcal sore throat 6/29/2012   • Urinary bladder stone 8/21/09    small stone     PSHx:   Past Surgical History:   Procedure Laterality Date   • BIOPSY/R recurrent pneumonia            Hematologic: The patient reports no easy bruising, frequent gum bleeding or nose bleeding;   The patient has no history of known chronic anemia            Dermatologic: The patient reports no recent rashes or chronic skin diso last 168 hours. Invalid input(s): BILITOT, BILIDIR, PROT, LABALBU    Imaging: None    Impression:   44 y/o male with a h/o EoE presenting with esophageal food impaction. Clinically otherwise stable    Recommendations:   1.  Plan for EGD today in OR under

## 2020-01-07 NOTE — ANESTHESIA PROCEDURE NOTES
Airway  Date/Time: 1/6/2020 10:32 PM  Urgency: elective      General Information and Staff    Patient location during procedure: OR  Anesthesiologist: Ariel Nguyen MD  Performed: anesthesiologist     Indications and Patient Condition  Indications for

## 2020-01-07 NOTE — ED PROVIDER NOTES
Patient Seen in: [facility] Emergency Department      History   Patient presents with:  FB in Throat    Stated Complaint: poss food bolus    HPI    51-year-old male presents for evaluation of food bolus.   Patient was eating pork about 1 hour ago whe • ORAL SURGERY PROCEDURE  19 y/o    wisdom teeth extraction   • OTHER SURGICAL HISTORY      I & D of leg wound staph infection   • TONGUE AND MOUTH SURG UNLISTED  2007    s/p right lateral tongue partial glossectomy for epithelial dysplasia and lichenoid Impression:  Esophageal obstruction due to food impaction  (primary encounter diagnosis)    Disposition:  Send to or/cath/gi  1/6/2020  8:39 pm    Follow-up:  Cyndee Maldonado MD  [address] [place] [address] Dr Sony Hanna [phone]    Schedule an appoi

## 2020-02-05 ENCOUNTER — TELEPHONE (OUTPATIENT)
Dept: FAMILY MEDICINE CLINIC | Facility: CLINIC | Age: 45
End: 2020-02-05

## 2020-02-05 ENCOUNTER — OFFICE VISIT (OUTPATIENT)
Dept: FAMILY MEDICINE CLINIC | Facility: CLINIC | Age: 45
End: 2020-02-05
Payer: COMMERCIAL

## 2020-02-05 ENCOUNTER — HOSPITAL ENCOUNTER (OUTPATIENT)
Dept: GENERAL RADIOLOGY | Age: 45
Discharge: HOME | End: 2020-02-05
Attending: FAMILY MEDICINE
Payer: COMMERCIAL

## 2020-02-05 VITALS
DIASTOLIC BLOOD PRESSURE: 88 MMHG | TEMPERATURE: 98 F | OXYGEN SATURATION: 98 % | SYSTOLIC BLOOD PRESSURE: 132 MMHG | HEIGHT: 68 IN | RESPIRATION RATE: 16 BRPM | HEART RATE: 66 BPM | WEIGHT: 167 LBS | BODY MASS INDEX: 25.31 KG/M2

## 2020-02-05 DIAGNOSIS — R05.9 COUGH: ICD-10-CM

## 2020-02-05 DIAGNOSIS — R05.9 COUGH: Primary | ICD-10-CM

## 2020-02-05 LAB
ADENOVIRUS PCR:: NEGATIVE
B PERT DNA SPEC QL NAA+PROBE: NEGATIVE
C PNEUM DNA SPEC QL NAA+PROBE: NEGATIVE
CORONAVIRUS 229E PCR:: NEGATIVE
CORONAVIRUS HKU1 PCR:: NEGATIVE
CORONAVIRUS NL63 PCR:: NEGATIVE
CORONAVIRUS OC43 PCR:: NEGATIVE
FLUAV RNA SPEC QL NAA+PROBE: NEGATIVE
FLUBV RNA SPEC QL NAA+PROBE: NEGATIVE
METAPNEUMOVIRUS PCR:: NEGATIVE
MYCOPLASMA PNEUMONIA PCR:: NEGATIVE
PARAINFLUENZA 1 PCR:: NEGATIVE
PARAINFLUENZA 2 PCR:: NEGATIVE
PARAINFLUENZA 3 PCR:: NEGATIVE
PARAINFLUENZA 4 PCR:: NEGATIVE
RHINOVIRUS/ENTERO PCR:: NEGATIVE
RSV RNA SPEC QL NAA+PROBE: NEGATIVE

## 2020-02-05 PROCEDURE — 87581 M.PNEUMON DNA AMP PROBE: CPT | Performed by: FAMILY MEDICINE

## 2020-02-05 PROCEDURE — 99213 OFFICE O/P EST LOW 20 MIN: CPT | Performed by: FAMILY MEDICINE

## 2020-02-05 PROCEDURE — 87486 CHLMYD PNEUM DNA AMP PROBE: CPT | Performed by: FAMILY MEDICINE

## 2020-02-05 PROCEDURE — 87633 RESP VIRUS 12-25 TARGETS: CPT | Performed by: FAMILY MEDICINE

## 2020-02-05 PROCEDURE — 71046 X-RAY EXAM CHEST 2 VIEWS: CPT | Performed by: FAMILY MEDICINE

## 2020-02-05 PROCEDURE — 87798 DETECT AGENT NOS DNA AMP: CPT | Performed by: FAMILY MEDICINE

## 2020-02-05 RX ORDER — PREDNISONE 20 MG/1
60 TABLET ORAL DAILY
Qty: 15 TABLET | Refills: 0 | Status: SHIPPED | OUTPATIENT
Start: 2020-02-05 | End: 2020-02-10

## 2020-02-05 NOTE — TELEPHONE ENCOUNTER
Pt returned from [place] 13 days ago. Pt states cough,nasal congestion,chest heavyness,headache and fatigue. Sx started last night. Pt states he is at home and will stay at home until he hears from us. Pt states his wife also is sick with Sx but she did

## 2020-02-05 NOTE — TELEPHONE ENCOUNTER
Pt traveled to a different city in [place], he came back to [place] about 13 days, however pt is experiencing cough, runny nose, headache and heavy chest. Please triage pt. Pt transferred to triage nurse for advise.

## 2020-02-05 NOTE — PROGRESS NOTES
Pt here with cold symptoms    Returned from [facility] 1/23/20  Started - 2/3/20  Getting worse   OTC meds none   dry cough and congestion  +  sinus tenderness  Chest feels heavy   No  ear pain  No  throat pain  No  fever and chills  + sick contacts  Got flu sh

## 2020-02-05 NOTE — TELEPHONE ENCOUNTER
Spoke to patient - was in [place] 1/16/20 through 1/23/20. Patient developed URI symptoms, no fever. Spoke to Dandy Evangelista at [facility].  Patient has to present with a fever and be sick enough to be admitted to the hospital, and have traveled to [facility] (DP/SNF)

## 2020-02-15 ENCOUNTER — HOSPITAL ENCOUNTER (EMERGENCY)
Facility: HOSPITAL | Age: 45
Discharge: LEFT BEFORE DISCHARGE | End: 2020-02-15
Payer: COMMERCIAL

## 2020-02-15 VITALS
WEIGHT: 167.13 LBS | SYSTOLIC BLOOD PRESSURE: 137 MMHG | HEART RATE: 84 BPM | TEMPERATURE: 98 F | BODY MASS INDEX: 25 KG/M2 | RESPIRATION RATE: 20 BRPM | DIASTOLIC BLOOD PRESSURE: 82 MMHG | OXYGEN SATURATION: 98 %

## 2020-02-15 NOTE — ED INITIAL ASSESSMENT (HPI)
Patient arrives from home with FB in throat. States occurred after eating steak, Unable to swallow saliva.

## 2020-02-27 DIAGNOSIS — J45.20 MILD INTERMITTENT ASTHMA WITHOUT COMPLICATION: ICD-10-CM

## 2020-02-27 RX ORDER — ALBUTEROL SULFATE 90 UG/1
1 AEROSOL, METERED RESPIRATORY (INHALATION) EVERY 6 HOURS PRN
Qty: 1 INHALER | Refills: 0 | Status: SHIPPED | OUTPATIENT
Start: 2020-02-27 | End: 2020-03-02 | Stop reason: ALTCHOICE

## 2020-02-29 ENCOUNTER — TELEPHONE (OUTPATIENT)
Dept: FAMILY MEDICINE CLINIC | Facility: CLINIC | Age: 45
End: 2020-02-29

## 2020-02-29 NOTE — TELEPHONE ENCOUNTER
Pt stated his insurance does not cover the albuterol, can pt get an alternate. Please call and advise.

## 2020-03-02 RX ORDER — ALBUTEROL SULFATE 90 UG/1
2 AEROSOL, METERED RESPIRATORY (INHALATION) EVERY 6 HOURS PRN
Qty: 1 INHALER | Refills: 0 | Status: SHIPPED | OUTPATIENT
Start: 2020-03-02

## 2020-04-10 ENCOUNTER — ANESTHESIA EVENT (OUTPATIENT)
Dept: SURGERY | Facility: HOSPITAL | Age: 45
End: 2020-04-10
Payer: COMMERCIAL

## 2020-04-10 ENCOUNTER — HOSPITAL ENCOUNTER (EMERGENCY)
Facility: HOSPITAL | Age: 45
Discharge: HOME | End: 2020-04-10
Attending: EMERGENCY MEDICINE
Payer: COMMERCIAL

## 2020-04-10 ENCOUNTER — ANESTHESIA (OUTPATIENT)
Dept: SURGERY | Facility: HOSPITAL | Age: 45
End: 2020-04-10
Payer: COMMERCIAL

## 2020-04-10 VITALS
OXYGEN SATURATION: 96 % | TEMPERATURE: 99 F | WEIGHT: 170 LBS | RESPIRATION RATE: 15 BRPM | HEIGHT: 68 IN | SYSTOLIC BLOOD PRESSURE: 101 MMHG | BODY MASS INDEX: 25.76 KG/M2 | HEART RATE: 69 BPM | DIASTOLIC BLOOD PRESSURE: 82 MMHG

## 2020-04-10 DIAGNOSIS — K22.2 ESOPHAGEAL OBSTRUCTION DUE TO FOOD IMPACTION: Primary | ICD-10-CM

## 2020-04-10 DIAGNOSIS — T18.128A ESOPHAGEAL OBSTRUCTION DUE TO FOOD IMPACTION: Primary | ICD-10-CM

## 2020-04-10 DIAGNOSIS — T18.108A ESOPHAGEAL FOREIGN BODY: ICD-10-CM

## 2020-04-10 PROCEDURE — 99285 EMERGENCY DEPT VISIT HI MDM: CPT

## 2020-04-10 PROCEDURE — 88342 IMHCHEM/IMCYTCHM 1ST ANTB: CPT | Performed by: INTERNAL MEDICINE

## 2020-04-10 PROCEDURE — 0DC38ZZ EXTIRPATION OF MATTER FROM LOWER ESOPHAGUS, VIA NATURAL OR ARTIFICIAL OPENING ENDOSCOPIC: ICD-10-PCS | Performed by: INTERNAL MEDICINE

## 2020-04-10 PROCEDURE — 88305 TISSUE EXAM BY PATHOLOGIST: CPT | Performed by: INTERNAL MEDICINE

## 2020-04-10 RX ORDER — PANTOPRAZOLE SODIUM 40 MG/1
40 TABLET, DELAYED RELEASE ORAL
Qty: 60 TABLET | Refills: 0 | Status: SHIPPED | OUTPATIENT
Start: 2020-04-10 | End: 2020-05-10

## 2020-04-10 RX ORDER — SODIUM CHLORIDE, SODIUM LACTATE, POTASSIUM CHLORIDE, CALCIUM CHLORIDE 600; 310; 30; 20 MG/100ML; MG/100ML; MG/100ML; MG/100ML
INJECTION, SOLUTION INTRAVENOUS CONTINUOUS PRN
Status: DISCONTINUED | OUTPATIENT
Start: 2020-04-10 | End: 2020-04-10 | Stop reason: SURG

## 2020-04-10 RX ORDER — NALOXONE HYDROCHLORIDE 0.4 MG/ML
80 INJECTION, SOLUTION INTRAMUSCULAR; INTRAVENOUS; SUBCUTANEOUS AS NEEDED
Status: DISCONTINUED | OUTPATIENT
Start: 2020-04-10 | End: 2020-04-11

## 2020-04-10 RX ORDER — DEXAMETHASONE SODIUM PHOSPHATE 4 MG/ML
VIAL (ML) INJECTION AS NEEDED
Status: DISCONTINUED | OUTPATIENT
Start: 2020-04-10 | End: 2020-04-10 | Stop reason: SURG

## 2020-04-10 RX ORDER — ONDANSETRON 2 MG/ML
INJECTION INTRAMUSCULAR; INTRAVENOUS AS NEEDED
Status: DISCONTINUED | OUTPATIENT
Start: 2020-04-10 | End: 2020-04-10 | Stop reason: SURG

## 2020-04-10 RX ORDER — HYDROMORPHONE HYDROCHLORIDE 1 MG/ML
0.4 INJECTION, SOLUTION INTRAMUSCULAR; INTRAVENOUS; SUBCUTANEOUS EVERY 5 MIN PRN
Status: DISCONTINUED | OUTPATIENT
Start: 2020-04-10 | End: 2020-04-11

## 2020-04-10 RX ORDER — SODIUM CHLORIDE, SODIUM LACTATE, POTASSIUM CHLORIDE, CALCIUM CHLORIDE 600; 310; 30; 20 MG/100ML; MG/100ML; MG/100ML; MG/100ML
INJECTION, SOLUTION INTRAVENOUS CONTINUOUS
Status: DISCONTINUED | OUTPATIENT
Start: 2020-04-10 | End: 2020-04-11

## 2020-04-10 RX ADMIN — SODIUM CHLORIDE, SODIUM LACTATE, POTASSIUM CHLORIDE, CALCIUM CHLORIDE: 600; 310; 30; 20 INJECTION, SOLUTION INTRAVENOUS at 22:57:00

## 2020-04-10 RX ADMIN — SODIUM CHLORIDE, SODIUM LACTATE, POTASSIUM CHLORIDE, CALCIUM CHLORIDE: 600; 310; 30; 20 INJECTION, SOLUTION INTRAVENOUS at 22:35:00

## 2020-04-10 RX ADMIN — ONDANSETRON 4 MG: 2 INJECTION INTRAMUSCULAR; INTRAVENOUS at 22:44:00

## 2020-04-10 RX ADMIN — DEXAMETHASONE SODIUM PHOSPHATE 4 MG: 4 MG/ML VIAL (ML) INJECTION at 22:44:00

## 2020-04-11 NOTE — ED PROVIDER NOTES
Patient Seen in: [facility] Emergency Department      History   Patient presents with:  FB in Throat    Stated Complaint: FB in throat     HPI    Patient presents with an esophageal food impaction and regurgitation of saliva.   He states that he has ESOPHAGOGASTRODUODENOSCOPY (EGD) N/A 1/6/2020    Performed by Stephan Tijerina MD at [facility] MAIN OR   • HERNIA SURGERY     • NECK/CHEST PROCEDURE UNLISTED  4 y/o     removal of neck growth   • ORAL SURGERY PROCEDURE  19 y/o    wisdom teeth extraction   • oriented to person, place, and time. Sensory: No sensory deficit. ED Course     Labs Reviewed   SURGICAL PATHOLOGY TISSUE          Gastroenterology was contacted and arrangements were made for patient to go to GI lab.         BELINDA Nolan

## 2020-04-11 NOTE — OPERATIVE REPORT
Operative Report-Esophagogastroduodenoscopy      PREOPERATIVE DIAGNOSIS/INDICATION: Food impaction, EoE     POSTOPERTATIVE DIAGNOSIS: Food impaction    PROCEDURE PERFORMED: EGD    INFORMED CONSENT: Once a brief history and physical food avoidance of dietary triggers    - Follow up in 2-3 weeks with Dr. Hamida Markham or [facility] for DC from a GI perspective         Moe Tate  4/10/2020  10:55 PM

## 2020-04-11 NOTE — ANESTHESIA PREPROCEDURE EVALUATION
PRE-OP EVALUATION    Patient Name: Loni    Pre-op Diagnosis: Esophageal foreign body [T18.108A]    Procedure(s):  ESOPHAGOGASTRODUODENOSCOPY (EGD) / FOOD BOLUS    Surgeon(s) and Role:     Dalton Alex MD - Primary    Pre-op vitals reviewed epithelial dysplasia and lichenoid inflammation      Social History    Tobacco Use      Smoking status: Never Smoker      Smokeless tobacco: Never Used    Alcohol use: Yes      Comment: occasional      Drug use: No     Available pre-op labs reviewed.

## 2020-04-11 NOTE — ANESTHESIA POSTPROCEDURE EVALUATION
[address] Patient Status:  Emergency   Age/Gender 39year old male MRN [id number]   Location [address] [facility] Attending [facility], [address] [place] [address] Se Day # 0 PCP Eden Naranjo DO       Anesthesia Post-op Note    Procedure

## 2020-04-11 NOTE — CONSULTS
[facility]                       Gastroenterology [address] [place] [address] Gastroenterology    Phyllisdixie Fairbanks Patient Status:  Emergency    1975 MRN [id number]   Location [address] [facility] Attending Priscilla Chu MD Past Surgical History:   Procedure Laterality Date   • BIOPSY/REMOVAL, LYMPH NODE(S)  9/09    retroperitoneal messenteric nodes, Negative   • ESOPHAGOGASTRODUODENOSCOPY (EGD) N/A 1/6/2020    Performed by Ebony Zhang MD at [facility] MAIN OR   • HERNIA known chronic anemia            Dermatologic: The patient reports no recent rashes or chronic skin disorders            Rheumatologic: The patient reports no history of chronic arthritis, myalgias, arthralgias            Genitourinary:  The patient reports LABALBU        Assessment and Plan:    - will plan for EGD for food impaction  - Consent for endoscopic procedure obtained after explanation of procedure and discussion of risks, not limited to but including bleeding, perforation, adverse effects from anes

## 2020-04-11 NOTE — ANESTHESIA PROCEDURE NOTES
Airway  Urgency: elective    Airway not difficult    General Information and Staff    Patient location during procedure: OR  Anesthesiologist: Lashell Lopez MD  Performed: anesthesiologist     Indications and Patient Condition  Indications for airway manag

## 2020-04-24 ENCOUNTER — TELEPHONE (OUTPATIENT)
Dept: FAMILY MEDICINE CLINIC | Facility: CLINIC | Age: 45
End: 2020-04-24

## 2020-04-24 NOTE — TELEPHONE ENCOUNTER
Patient is requesting Albuterol HFA. Patient needs to make a telephone visit with Dr Kanwal Alatorre. Last visit was 02/2020 for a cough.

## 2020-06-09 ENCOUNTER — ANESTHESIA (OUTPATIENT)
Dept: SURGERY | Facility: HOSPITAL | Age: 45
End: 2020-06-09
Payer: COMMERCIAL

## 2020-06-09 ENCOUNTER — ANESTHESIA EVENT (OUTPATIENT)
Dept: SURGERY | Facility: HOSPITAL | Age: 45
End: 2020-06-09
Payer: COMMERCIAL

## 2020-06-09 ENCOUNTER — HOSPITAL ENCOUNTER (EMERGENCY)
Facility: HOSPITAL | Age: 45
Discharge: HOME | End: 2020-06-09
Attending: EMERGENCY MEDICINE
Payer: COMMERCIAL

## 2020-06-09 VITALS
HEART RATE: 59 BPM | OXYGEN SATURATION: 98 % | WEIGHT: 165 LBS | RESPIRATION RATE: 18 BRPM | TEMPERATURE: 97 F | DIASTOLIC BLOOD PRESSURE: 76 MMHG | BODY MASS INDEX: 25.01 KG/M2 | HEIGHT: 68 IN | SYSTOLIC BLOOD PRESSURE: 117 MMHG

## 2020-06-09 DIAGNOSIS — T18.128A ESOPHAGEAL OBSTRUCTION DUE TO FOOD IMPACTION: Primary | ICD-10-CM

## 2020-06-09 DIAGNOSIS — K22.2 ESOPHAGEAL OBSTRUCTION DUE TO FOOD IMPACTION: Primary | ICD-10-CM

## 2020-06-09 DIAGNOSIS — T18.128A FOOD IMPACTION OF ESOPHAGUS: ICD-10-CM
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
## 2020-06-09 PROCEDURE — 0DC58ZZ EXTIRPATION OF MATTER FROM ESOPHAGUS, VIA NATURAL OR ARTIFICIAL OPENING ENDOSCOPIC: ICD-10-PCS | Performed by: INTERNAL MEDICINE

## 2020-06-09 PROCEDURE — 99285 EMERGENCY DEPT VISIT HI MDM: CPT

## 2020-06-09 PROCEDURE — 88305 TISSUE EXAM BY PATHOLOGIST: CPT | Performed by: INTERNAL MEDICINE

## 2020-06-09 PROCEDURE — 0DB38ZX EXCISION OF LOWER ESOPHAGUS, VIA NATURAL OR ARTIFICIAL OPENING ENDOSCOPIC, DIAGNOSTIC: ICD-10-PCS | Performed by: INTERNAL MEDICINE

## 2020-06-09 PROCEDURE — 0DB18ZX EXCISION OF UPPER ESOPHAGUS, VIA NATURAL OR ARTIFICIAL OPENING ENDOSCOPIC, DIAGNOSTIC: ICD-10-PCS | Performed by: INTERNAL MEDICINE

## 2020-06-09 RX ORDER — SODIUM CHLORIDE 9 MG/ML
INJECTION, SOLUTION INTRAVENOUS CONTINUOUS PRN
Status: DISCONTINUED | OUTPATIENT
Start: 2020-06-09 | End: 2020-06-09 | Stop reason: SURG

## 2020-06-09 RX ORDER — LIDOCAINE HYDROCHLORIDE 10 MG/ML
INJECTION, SOLUTION EPIDURAL; INFILTRATION; INTRACAUDAL; PERINEURAL AS NEEDED
Status: DISCONTINUED | OUTPATIENT
Start: 2020-06-09 | End: 2020-06-09 | Stop reason: SURG

## 2020-06-09 RX ORDER — SODIUM CHLORIDE, SODIUM LACTATE, POTASSIUM CHLORIDE, CALCIUM CHLORIDE 600; 310; 30; 20 MG/100ML; MG/100ML; MG/100ML; MG/100ML
INJECTION, SOLUTION INTRAVENOUS CONTINUOUS
Status: DISCONTINUED | OUTPATIENT
Start: 2020-06-09 | End: 2020-06-10

## 2020-06-09 RX ORDER — MIDAZOLAM HYDROCHLORIDE 1 MG/ML
INJECTION INTRAMUSCULAR; INTRAVENOUS AS NEEDED
Status: DISCONTINUED | OUTPATIENT
Start: 2020-06-09 | End: 2020-06-09 | Stop reason: SURG

## 2020-06-09 RX ORDER — ONDANSETRON 2 MG/ML
4 INJECTION INTRAMUSCULAR; INTRAVENOUS AS NEEDED
Status: DISCONTINUED | OUTPATIENT
Start: 2020-06-09 | End: 2020-06-10

## 2020-06-09 RX ORDER — HYDROMORPHONE HYDROCHLORIDE 1 MG/ML
0.4 INJECTION, SOLUTION INTRAMUSCULAR; INTRAVENOUS; SUBCUTANEOUS EVERY 5 MIN PRN
Status: DISCONTINUED | OUTPATIENT
Start: 2020-06-09 | End: 2020-06-10

## 2020-06-09 RX ORDER — HYDROCODONE BITARTRATE AND ACETAMINOPHEN 5; 325 MG/1; MG/1
2 TABLET ORAL AS NEEDED
Status: COMPLETED | OUTPATIENT
Start: 2020-06-09 | End: 2020-06-09

## 2020-06-09 RX ORDER — HYDROCODONE BITARTRATE AND ACETAMINOPHEN 5; 325 MG/1; MG/1
1 TABLET ORAL AS NEEDED
Status: COMPLETED | OUTPATIENT
Start: 2020-06-09 | End: 2020-06-09

## 2020-06-09 RX ORDER — METOCLOPRAMIDE HYDROCHLORIDE 5 MG/ML
10 INJECTION INTRAMUSCULAR; INTRAVENOUS AS NEEDED
Status: DISCONTINUED | OUTPATIENT
Start: 2020-06-09 | End: 2020-06-10

## 2020-06-09 RX ORDER — NALOXONE HYDROCHLORIDE 0.4 MG/ML
80 INJECTION, SOLUTION INTRAMUSCULAR; INTRAVENOUS; SUBCUTANEOUS AS NEEDED
Status: DISCONTINUED | OUTPATIENT
Start: 2020-06-09 | End: 2020-06-10

## 2020-06-09 RX ORDER — MIDAZOLAM HYDROCHLORIDE 1 MG/ML
1 INJECTION INTRAMUSCULAR; INTRAVENOUS EVERY 5 MIN PRN
Status: DISCONTINUED | OUTPATIENT
Start: 2020-06-09 | End: 2020-06-10

## 2020-06-09 RX ORDER — ONDANSETRON 2 MG/ML
INJECTION INTRAMUSCULAR; INTRAVENOUS AS NEEDED
Status: DISCONTINUED | OUTPATIENT
Start: 2020-06-09 | End: 2020-06-09 | Stop reason: SURG

## 2020-06-09 RX ORDER — DEXAMETHASONE SODIUM PHOSPHATE 4 MG/ML
VIAL (ML) INJECTION AS NEEDED
Status: DISCONTINUED | OUTPATIENT
Start: 2020-06-09 | End: 2020-06-09 | Stop reason: SURG

## 2020-06-09 RX ORDER — LABETALOL HYDROCHLORIDE 5 MG/ML
5 INJECTION, SOLUTION INTRAVENOUS EVERY 5 MIN PRN
Status: DISCONTINUED | OUTPATIENT
Start: 2020-06-09 | End: 2020-06-10

## 2020-06-09 RX ORDER — MEPERIDINE HYDROCHLORIDE 25 MG/ML
12.5 INJECTION INTRAMUSCULAR; INTRAVENOUS; SUBCUTANEOUS AS NEEDED
Status: DISCONTINUED | OUTPATIENT
Start: 2020-06-09 | End: 2020-06-10

## 2020-06-09 RX ADMIN — DEXAMETHASONE SODIUM PHOSPHATE 8 MG: 4 MG/ML VIAL (ML) INJECTION at 20:26:00

## 2020-06-09 RX ADMIN — SODIUM CHLORIDE: 9 INJECTION, SOLUTION INTRAVENOUS at 20:17:00

## 2020-06-09 RX ADMIN — LIDOCAINE HYDROCHLORIDE 30 MG: 10 INJECTION, SOLUTION EPIDURAL; INFILTRATION; INTRACAUDAL; PERINEURAL at 20:22:00

## 2020-06-09 RX ADMIN — MIDAZOLAM HYDROCHLORIDE 2 MG: 1 INJECTION INTRAMUSCULAR; INTRAVENOUS at 20:17:00

## 2020-06-09 RX ADMIN — ONDANSETRON 4 MG: 2 INJECTION INTRAMUSCULAR; INTRAVENOUS at 20:30:00

## 2020-06-09 RX ADMIN — SODIUM CHLORIDE: 9 INJECTION, SOLUTION INTRAVENOUS at 20:40:00

## 2020-06-09 NOTE — ED PROVIDER NOTES
Patient Seen in: [facility] Emergency Department      History   Patient presents with:  FB in Throat    Stated Complaint: Hot dog food bolus x1 hour     HPI    Patient is a 51-year-old male with a history of eosinophilic esophagitis.   He has had 3 p Streptococcal sore throat 6/29/2012   • Urinary bladder stone 8/21/09    small stone   • Wheezing     Allergy Related              Past Surgical History:   Procedure Laterality Date   • BIOPSY/REMOVAL, LYMPH NODE(S)  9/09    retroperitoneal messenteric nod Cardiovascular:      Rate and Rhythm: Normal rate and regular rhythm. Heart sounds: Normal heart sounds. Pulmonary:      Effort: Pulmonary effort is normal.      Breath sounds: Normal breath sounds.    Abdominal:      General: Bowel sounds are norm

## 2020-06-10 NOTE — H&P
Gastroenterology outpatient H&P Note    Referring physician: Dr. Rob Guadalupe / Dr. Darien Woo    Reason for visit: Esophageal food bolus, Eosinophilic Esophagitis    Chief complaint: Esophageal food bolus    HPI: This is a 38 yo male with a known history of Eo media 10/07    right   • Problems with swallowing 2010   • Rash 1/08   • Right knee pain 8/09    with swelling/pain   • Shortness of breath     Allergy Related   • Sleep apnea    • Sleep disturbance    • Staph infection 8/20/09    of wound on the leg with Number of children: Not on file      Years of education: Not on file      Highest education level: Not on file    Occupational History      Occupation:     Social Needs      Financial resource strain: Not on file      Food insecurity:        Rickey Arevalo CT or other xrays: Teresa Coburn Gastrointestinal complaints: Swallowing Problems    Bloating    Hernia    Food/milk intolerance         In addition to the pertinent positives described above:             Infectious Disease:  No chronic infections or recent fever Warm; no rashes  Psychiatric: Appropriate mood and congruent affect; no suicidal ideation        Labs: RAPID SARS-COV-2 BY PCR   Order: [id number]   Collected:  6/9/2020  7:12 PM Status:  Final result   Specimen Information: Nares;  Other        Component  R

## 2020-06-10 NOTE — ANESTHESIA PROCEDURE NOTES
Airway  Date/Time: 6/9/2020 8:22 PM  Urgency: elective      General Information and Staff    Patient location during procedure: OR  Anesthesiologist: Almas Joy MD  Performed: anesthesiologist     Indications and Patient Condition  Indications for ai

## 2020-06-10 NOTE — ANESTHESIA PREPROCEDURE EVALUATION
PRE-OP EVALUATION    Patient Name: Loni    Pre-op Diagnosis: Food impaction of esophagus [T18.128A]    Procedure(s):  ESOPHAGOGASTRODUODENOSCOPY (EGD)    Surgeon(s) and Role:     * Renetta Pearson MD - Primary    Pre-op vitals reviewed.   Temp: 99 SURGERY PROCEDURE  17 y/o    wisdom teeth extraction   • OTHER SURGICAL HISTORY      I & D of leg wound staph infection   • TONGUE AND MOUTH SURG UNLISTED  2007    s/p right lateral tongue partial glossectomy for epithelial dysplasia and lichenoid inflamma

## 2020-06-10 NOTE — ANESTHESIA POSTPROCEDURE EVALUATION
[address] Patient Status:  Emergency   Age/Gender 39year old male MRN [id number]   [facility] SURGERY Attending Will Lopez MD   Hosp Day # 0 PCP Leslee Shaikh DO       Anesthesia Post-op Note    Procedure(s):  ESO

## 2020-06-10 NOTE — OPERATIVE REPORT
EGD operative report  Patient Name: Loni  Procedure: Esophagogastroduodenoscopy with cold forceps biopsy and removal of foreign body esophagus  Date of procedure: 6/9/2020    Indication: Eosinophilic Esophagitis with esophageal food bolus  Attendi incisors. Lnear furrowing was appreciated in the mid-esophagus. In the mid-esophagus, a bolus of meat was appreciated. With gentle insufflation, the meat was able to be advanced easily into the stomach.     Cold forceps biopsies were obtained from the [employer]

## 2020-08-08 ENCOUNTER — HOSPITAL ENCOUNTER (OUTPATIENT)
Age: 45
Discharge: HOME | End: 2020-08-08
Payer: COMMERCIAL

## 2020-08-08 VITALS
BODY MASS INDEX: 25.01 KG/M2 | DIASTOLIC BLOOD PRESSURE: 83 MMHG | HEART RATE: 62 BPM | HEIGHT: 68 IN | SYSTOLIC BLOOD PRESSURE: 130 MMHG | TEMPERATURE: 98 F | WEIGHT: 165 LBS | OXYGEN SATURATION: 98 % | RESPIRATION RATE: 16 BRPM

## 2020-08-08 DIAGNOSIS — Z20.822 COVID-19 RULED OUT: ICD-10-CM

## 2020-08-08 DIAGNOSIS — B34.9 VIRAL SYNDROME: Primary | ICD-10-CM

## 2020-08-08 PROCEDURE — U0003 INFECTIOUS AGENT DETECTION BY NUCLEIC ACID (DNA OR RNA); SEVERE ACUTE RESPIRATORY SYNDROME CORONAVIRUS 2 (SARS-COV-2) (CORONAVIRUS DISEASE [COVID-19]), AMPLIFIED PROBE TECHNIQUE, MAKING USE OF HIGH THROUGHPUT TECHNOLOGIES AS DESCRIBED BY CMS-2020-01-R: HCPCS | Performed by: PHYSICIAN ASSISTANT

## 2020-08-08 PROCEDURE — 99213 OFFICE O/P EST LOW 20 MIN: CPT | Performed by: PHYSICIAN ASSISTANT

## 2020-08-08 NOTE — ED INITIAL ASSESSMENT (HPI)
Since yesterday, c/o sore throat, headache, nausea, intermittent mid abdominal pain and fatigue. Denies fevers.

## 2020-08-08 NOTE — ED PROVIDER NOTES
Patient Seen in: [facility]      History   Patient presents with:  Cough/URI    Stated Complaint: TL-sore throat, headache, fatigue.  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 Musculoskeletal:  neck is supple non tender.  no meningeal signs        Extremities are symmetrical, full range of motion  Psychiatric: there is no agitation      ED Course     Labs Reviewed   SARS-COV-2 BY PCR ()       COVID-19 testing sent b reevaluation if not better          Medications Prescribed:  Current Discharge Medication List

## 2020-08-10 LAB — SARS-COV-2 RNA RESP QL NAA+PROBE: NOT DETECTED

## 2020-12-21 ENCOUNTER — TELEMEDICINE (OUTPATIENT)
Dept: FAMILY MEDICINE CLINIC | Facility: CLINIC | Age: 45
End: 2020-12-21
Payer: COMMERCIAL

## 2020-12-21 DIAGNOSIS — R20.0 LEG NUMBNESS: Primary | ICD-10-CM

## 2020-12-21 DIAGNOSIS — R10.9 ABDOMINAL DISCOMFORT: ICD-10-CM

## 2020-12-21 PROCEDURE — 99213 OFFICE O/P EST LOW 20 MIN: CPT | Performed by: FAMILY MEDICINE

## 2020-12-21 NOTE — PROGRESS NOTES
This visit is conducted using Telemedicine with live, interactive video and audio. Patient has been referred to the [facility] website at [email or web address] to review the yearly Consent to Treat document.     Patient understands and accepts financial res • Heartburn 2010    Occasionally   • Inguinal hernia 8/21/09    development of small right inguinal hernia   • Multiple allergies    • Nephrolithiasis 5/07    right, symptomatic recurrent, s/p lithotripsy   • Nostril infection 8/06    right, with celluli 300 MG Oral Tablet 24 Hr Take 300 mg by mouth every morning. • Albuterol Sulfate HFA (VENTOLIN HFA) 108 (90 Base) MCG/ACT Inhalation Aero Soln Inhale 2 puffs into the lungs every 6 (six) hours as needed for Wheezing.  1 Inhaler 0   • buPROPion HCl ER, S

## 2020-12-22 ENCOUNTER — APPOINTMENT (OUTPATIENT)
Dept: CT IMAGING | Facility: HOSPITAL | Age: 45
End: 2020-12-22
Attending: INTERNAL MEDICINE
Payer: COMMERCIAL

## 2020-12-22 ENCOUNTER — APPOINTMENT (OUTPATIENT)
Dept: CT IMAGING | Facility: HOSPITAL | Age: 45
End: 2020-12-22
Attending: EMERGENCY MEDICINE
Payer: COMMERCIAL

## 2020-12-22 ENCOUNTER — APPOINTMENT (OUTPATIENT)
Dept: CV DIAGNOSTICS | Facility: HOSPITAL | Age: 45
End: 2020-12-22
Attending: INTERNAL MEDICINE
Payer: COMMERCIAL

## 2020-12-22 ENCOUNTER — HOSPITAL ENCOUNTER (OUTPATIENT)
Facility: HOSPITAL | Age: 45
Setting detail: OBSERVATION
Discharge: HOME | End: 2020-12-23
Attending: EMERGENCY MEDICINE | Admitting: INTERNAL MEDICINE
Payer: COMMERCIAL

## 2020-12-22 DIAGNOSIS — R29.898 LEFT ARM WEAKNESS: ICD-10-CM

## 2020-12-22 DIAGNOSIS — R20.0 LEFT SIDED NUMBNESS: Primary | ICD-10-CM

## 2020-12-22 PROBLEM — R10.9 LEFT FLANK PAIN: Status: ACTIVE | Noted: 2020-12-22

## 2020-12-22 PROCEDURE — 93306 TTE W/DOPPLER COMPLETE: CPT | Performed by: INTERNAL MEDICINE

## 2020-12-22 PROCEDURE — 74176 CT ABD & PELVIS W/O CONTRAST: CPT | Performed by: INTERNAL MEDICINE

## 2020-12-22 PROCEDURE — 70450 CT HEAD/BRAIN W/O DYE: CPT | Performed by: EMERGENCY MEDICINE

## 2020-12-22 PROCEDURE — 70496 CT ANGIOGRAPHY HEAD: CPT | Performed by: EMERGENCY MEDICINE

## 2020-12-22 PROCEDURE — 70498 CT ANGIOGRAPHY NECK: CPT | Performed by: EMERGENCY MEDICINE

## 2020-12-22 PROCEDURE — 99220 INITIAL OBSERVATION CARE,LEVL III: CPT | Performed by: INTERNAL MEDICINE

## 2020-12-22 RX ORDER — ASPIRIN 300 MG
300 SUPPOSITORY, RECTAL RECTAL DAILY
Status: DISCONTINUED | OUTPATIENT
Start: 2020-12-23 | End: 2020-12-23

## 2020-12-22 RX ORDER — SODIUM CHLORIDE 9 MG/ML
INJECTION, SOLUTION INTRAVENOUS CONTINUOUS
Status: DISCONTINUED | OUTPATIENT
Start: 2020-12-22 | End: 2020-12-23

## 2020-12-22 RX ORDER — IBUPROFEN 200 MG
400 TABLET ORAL EVERY 6 HOURS PRN
COMMUNITY

## 2020-12-22 RX ORDER — LABETALOL HYDROCHLORIDE 5 MG/ML
10 INJECTION, SOLUTION INTRAVENOUS EVERY 10 MIN PRN
Status: DISCONTINUED | OUTPATIENT
Start: 2020-12-22 | End: 2020-12-23

## 2020-12-22 RX ORDER — ASPIRIN 325 MG
325 TABLET ORAL DAILY
Status: DISCONTINUED | OUTPATIENT
Start: 2020-12-23 | End: 2020-12-23

## 2020-12-22 RX ORDER — METOCLOPRAMIDE HYDROCHLORIDE 5 MG/ML
10 INJECTION INTRAMUSCULAR; INTRAVENOUS EVERY 8 HOURS PRN
Status: DISCONTINUED | OUTPATIENT
Start: 2020-12-22 | End: 2020-12-23

## 2020-12-22 RX ORDER — OMEPRAZOLE 20 MG/1
20 CAPSULE, DELAYED RELEASE ORAL
COMMUNITY

## 2020-12-22 RX ORDER — ACETAMINOPHEN 650 MG/1
650 SUPPOSITORY RECTAL EVERY 4 HOURS PRN
Status: DISCONTINUED | OUTPATIENT
Start: 2020-12-22 | End: 2020-12-23

## 2020-12-22 RX ORDER — HYDRALAZINE HYDROCHLORIDE 20 MG/ML
10 INJECTION INTRAMUSCULAR; INTRAVENOUS EVERY 2 HOUR PRN
Status: DISCONTINUED | OUTPATIENT
Start: 2020-12-22 | End: 2020-12-23

## 2020-12-22 RX ORDER — ONDANSETRON 2 MG/ML
4 INJECTION INTRAMUSCULAR; INTRAVENOUS EVERY 6 HOURS PRN
Status: DISCONTINUED | OUTPATIENT
Start: 2020-12-22 | End: 2020-12-23

## 2020-12-22 RX ORDER — ACETAMINOPHEN 325 MG/1
650 TABLET ORAL EVERY 4 HOURS PRN
Status: DISCONTINUED | OUTPATIENT
Start: 2020-12-22 | End: 2020-12-23

## 2020-12-22 RX ORDER — PANTOPRAZOLE SODIUM 20 MG/1
20 TABLET, DELAYED RELEASE ORAL
Status: DISCONTINUED | OUTPATIENT
Start: 2020-12-23 | End: 2020-12-23

## 2020-12-22 RX ORDER — FLUTICASONE PROPIONATE 50 MCG
2 SPRAY, SUSPENSION (ML) NASAL DAILY
COMMUNITY

## 2020-12-22 RX ORDER — BUPROPION HYDROCHLORIDE 150 MG/1
300 TABLET ORAL EVERY MORNING
Status: DISCONTINUED | OUTPATIENT
Start: 2020-12-23 | End: 2020-12-23

## 2020-12-22 RX ORDER — ASPIRIN 81 MG/1
324 TABLET, CHEWABLE ORAL ONCE
Status: COMPLETED | OUTPATIENT
Start: 2020-12-22 | End: 2020-12-22

## 2020-12-22 NOTE — ED NOTES
Patient's pulse has returned to normal. Patient reports he still feels faint. Patient placed supine and reports he feels better.

## 2020-12-22 NOTE — ED NOTES
Patient's skin is returned to normal. Patient reports he still feels faint. Side rails are up. Patient has call light.

## 2020-12-22 NOTE — ED NOTES
Patient is reporting that he feels like his heart is racing. Patient is diaphoretic, but pink. Breathing is normal. Pulse is 136. Placed patient on the monitor and ordered EKG.

## 2020-12-22 NOTE — PLAN OF CARE
Assumed care at 0700  A&O x 4. Only neuro deficit is numbness on L side  C/o lower R back pain in evening  Lungs clear  HR NS on tele. St with activity  Passed swallow study  Echo done  Meds given per orders  Urine output adequate.  Using urinal  Pt needs a

## 2020-12-22 NOTE — ED NOTES
MD at bedside; reports no call on the stroke, but patient can go to CT when ready and will not wait on labs.

## 2020-12-22 NOTE — ED INITIAL ASSESSMENT (HPI)
Per pt, \"yesterday my left leg went numb around my calf and then today my left arm, hand and face are numb. I'm lightheaded and nauseated. \"  +mild headache off and on for last week  No slurred speech.    No fevers    Pt presents alert, orientedx4, easy WO

## 2020-12-22 NOTE — ED PROVIDER NOTES
Patient Seen in: [facility] Emergency Department      History   Patient presents with:  Numbness Weakness    Stated Complaint: left sided numbness/weakness since about 12pm yesterday    HPI    Patient is a 43-year-old male coming for evaluation toda pain 8/09    with swelling/pain   • Shortness of breath     Allergy Related   • Sleep apnea    • Sleep disturbance    • Staph infection 8/20/09    of wound on the leg with cellulitis.  s/p I & D   • Streptococcal sore throat 6/29/2012   • Urinary bladder st nursing note reviewed. Constitutional:       General: He is not in acute distress. Appearance: He is well-developed. He is not toxic-appearing. HENT:      Head: Normocephalic and atraumatic. Eyes:      General: No scleral icterus.      Conjunctiva RAINBOW DRAW LIGHT GREEN   RAINBOW DRAW GOLD   CBC W/ DIFFERENTIAL     CT STROKE CTA BRAIN/CTA NECK (W IV)(CPT=70496/21816)   Final Result    PROCEDURE:  CT STROKE CTA BRAIN/CTA NECK (W IV)(CPT=70496/09205)         COMPARISON:  [facility] , CT, CT STROKE BRA VERTEBRAL:  No hemodynamically significant stenosis or dissection         RIGHT    INTERNAL CAROTID:   No hemodynamically significant stenosis or dissection    EXTERNAL CAROTID:    No hemodynamically significant stenosis or dissection    COMMON CAROTID: infarct. There is no hemorrhage or     mass lesion. SINUSES:           No sign of acute sinusitis. MASTOIDS:          No sign of acute inflammation. SKULL:             No evidence for fracture or osseous abnormality.     OTHER:             Non diagnosis)  Left arm weakness    Disposition:  There is no disposition on file for this visit. There is no disposition time on file for this visit. Follow-up:  No follow-up provider specified.         Medications Prescribed:  Current Discharge Medicatio

## 2020-12-23 ENCOUNTER — APPOINTMENT (OUTPATIENT)
Dept: MRI IMAGING | Facility: HOSPITAL | Age: 45
End: 2020-12-23
Attending: INTERNAL MEDICINE
Payer: COMMERCIAL

## 2020-12-23 VITALS
BODY MASS INDEX: 25.76 KG/M2 | DIASTOLIC BLOOD PRESSURE: 85 MMHG | TEMPERATURE: 97 F | RESPIRATION RATE: 18 BRPM | HEART RATE: 67 BPM | WEIGHT: 170 LBS | OXYGEN SATURATION: 100 % | SYSTOLIC BLOOD PRESSURE: 129 MMHG | HEIGHT: 68 IN

## 2020-12-23 PROCEDURE — 70546 MR ANGIOGRAPH HEAD W/O&W/DYE: CPT | Performed by: INTERNAL MEDICINE

## 2020-12-23 PROCEDURE — 99244 OFF/OP CNSLTJ NEW/EST MOD 40: CPT | Performed by: OTHER

## 2020-12-23 PROCEDURE — 99217 OBSERVATION CARE DISCHARGE: CPT | Performed by: HOSPITALIST

## 2020-12-23 PROCEDURE — 70549 MR ANGIOGRAPH NECK W/O&W/DYE: CPT | Performed by: INTERNAL MEDICINE

## 2020-12-23 PROCEDURE — 70553 MRI BRAIN STEM W/O & W/DYE: CPT | Performed by: INTERNAL MEDICINE

## 2020-12-23 NOTE — OCCUPATIONAL THERAPY NOTE
OCCUPATIONAL THERAPY QUICK EVALUATION - INPATIENT    Room Number: 5291/2824-R  Evaluation Date: 12/23/2020     Type of Evaluation: Quick Eval  Presenting Problem: (L sided numbess)    Physician Order: IP Consult to Occupational Therapy  Reason for Therapy: • Food intolerance     Food Allergies   • Gastroenteritis 20/06   • H/O lymph node biopsy 8/07    numerous mesenteric and retroperitoneal nodes, s/p bx 9/09 negative   • Heartburn 2010    Occasionally   • Inguinal hernia 8/21/09    development of small r Two level;Bed/bath upstairs  Lives With: Spouse(3 kids )               Occupation/Status: ()  Hand Dominance: Right  Drives: Yes  Patient Regularly Uses: Reading glasses    Prior Level of Function:   Independent with all ADLs, IADLss.  Works as an TRANSFER ASSESSMENT  Supine to Sit : Independent  Sit to Stand: Independent    Skilled Therapy Provided:   OT role explained. UE strength and coordination WFL. Patient demonstrated independence with bed mobility, donning socks, functional mobility.    Able this admission.     Patient was able to achieve the following goals:  Patient able to toilet transfer: safely and independently  Patient able to dress lower extremities: safely and independently  Patient/Caregiver able to demonstrate safety with ADLS: safel

## 2020-12-23 NOTE — PROGRESS NOTES
Patient seen and examined. Medically clear to discharge today. MRI pending. Presentation consistent with likely underlying stress/anxiety. Echo unremarkable. CT with non obstructing left kidney stone. Pain now completely resolved.   Pt to monitor as OP a

## 2020-12-23 NOTE — CM/SW NOTE
12/23/20 1400   CM/SW Screening   Referral Source    Information Source Chart review;Nursing rounds   Patient's Mental Status Alert;Oriented   Patient lives with Spouse   Patient Status Prior to Admission   Independent with ADLs and Mobility

## 2020-12-23 NOTE — PHYSICAL THERAPY NOTE
PHYSICAL THERAPY QUICK EVALUATION - INPATIENT    Room Number: 8967/7447-O  Evaluation Date: 12/23/2020  Presenting Problem: L sided numbness, headache  Physician Order: PT Eval and Treat    History related to current admission: Pt is 39year old male adm • Inguinal hernia 8/21/09    development of small right inguinal hernia   • Multiple allergies    • Nephrolithiasis 5/07    right, symptomatic recurrent, s/p lithotripsy   • Nostril infection 8/06    right, with cellulitis, external redness, swelling and glasses    Prior Level of [place]: Pt previously independent with all ADLs/IADLs, drives, works, and does not use an AD at baseline.       SUBJECTIVE  \"I feel better than when I came in\"     OBJECTIVE  Precautions: Aspiration;Bed/chair alarm(- Independent  Distance (ft): 75  Assistive Device: None  Pattern: Within Functional Limits  Stoop/Curb Assistance: Not tested  Comment : Pt ascended and descended 4 steps with use of 1 rail, reciprocal gait pattern, and was indepedent     Skilled Therapy Pr level surfaces Safely and independently   Patient able to stair climb safely and independently. Therapists (PT/OT) wore appropriate PPE of surgical mask, goggles, and gloves when working with patient. Pt wore surgical mask.

## 2020-12-23 NOTE — H&P
[facility]                                                               History & Physical         Angeles Sultana Patient Status:  Observation    1975 MRN [id number]   [facility] 7NE-A Attending Adolfo Mcdaniel MD   [facility] Day # 0 allergies   • Food allergy     chicken, turkey, eggs causes throat to swell   • Food intolerance     Food Allergies   • Gastroenteritis 20/06   • H/O lymph node biopsy 8/07    numerous mesenteric and retroperitoneal nodes, s/p bx 9/09 negative   • Heartbur history:  Family History   Problem Relation Age of Onset   • Cancer Maternal Grandfather    • Other (hem cancer) Other    • Other (Other) Father 76        hypothyroidism   • Colon Polyps Father    • Stroke Father       Reviewed    Social history:   reports directed as needed. , Disp: 1 each, Rfl: 1, Unknown at Unknown time        Review of Systems:  A comprehensive 14 point review of systems was completed. Pertinent positives and negatives noted in the the HPI.     Physical Exam:     Vital signs: Blood pressu sided numbness/weakness since about 12pm yesterday     TECHNIQUE:  Noncontrast CT scanning is performed through the brain. Dose reduction techniques were used.  Dose information is transmitted to the [facility]) Lizzette Monique  Caitlyn's which includes the Dose Index   Registry. PATIENT STATED HISTORY:(As transcribed by Technologist)  Tingling left arm. Numbness in left face and legs which started yesterday.       CONTRAST USED:  60cc of Omnipaque 350     FINDINGS:    VASCULATURE:  No s weakness including left face numbness, rule out stroke  1.  CT of the brain negative and CTA head and neck with no evidence of high-grade stenosis or occlusion or dissection, no intracranial aneurysms identified  2. 2D echo done with EF of 60 to 65%, no reg

## 2020-12-23 NOTE — PLAN OF CARE
Assmed care at 0700  A&O x 4. Only neuro deficit is numbness on L side  C/o lower R back pain. Straining urine, sent home with strainer and advised to strain at home  HR NS on tele.  St with activity  Passed with pt/ot/sp  Meds given per orders  Urine outpu

## 2020-12-23 NOTE — CONSULTS
[facility] [facility] Preliminary Evaluation    Gris[place] Sanders Patient Status:  Observation    1975 MRN [id number]   [facility] 7NE-A Attending Rayne Muhammad MD   Hosp Day # 0 PCP Janeen Christina DO     REASON FOR CONSULTATION: Heartburn 2010    Occasionally   • Inguinal hernia 8/21/09    development of small right inguinal hernia   • Multiple allergies    • Nephrolithiasis 5/07    right, symptomatic recurrent, s/p lithotripsy   • Nostril infection 8/06    right, with cellulitis, has never smoked. He has never used smokeless tobacco. He reports current alcohol use. He reports that he does not use drugs.     ALLERGIES:    Bananas                   Beef-Derived Produc*      Eggs Or Egg-Derived*      Food                        Comment Continuous    •  acetaminophen (TYLENOL) tab 650 mg, 650 mg, Oral, Q4H PRN    Or    •  acetaminophen (TYLENOL) 650 MG rectal suppository 650 mg, 650 mg, Rectal, Q4H PRN    •  Labetalol HCl (TRANDATE) injection 10 mg, 10 mg, Intravenous, Q10 Min PRN    •  h 12/22/2020    ALT 26 12/22/2020    PTT 29.6 12/22/2020    INR 0.99 12/22/2020    PTP 13.4 12/22/2020    TROP <0.045 12/22/2020    PGLU 94 12/23/2020 12/22/2020 Lipid Panel   Chol 234   HDL 57   Triglycerides 130       12/22/2020 HgbA1c 5.5    Diag

## 2020-12-23 NOTE — CONSULTS
[facility]    Report of Consultation    Letypatricia Hung Patient Status:  Observation    1975 MRN [id number]   [facility] 7NE-A Attending Aida Osman MD   [facility] Day # 0 PCP Bernie Ocampo DO     Date of Admission:  2020  Lef 20/06   • H/O lymph node biopsy 8/07    numerous mesenteric and retroperitoneal nodes, s/p bx 9/09 negative   • Heartburn 2010    Occasionally   • Inguinal hernia 8/21/09    development of small right inguinal hernia   • Multiple allergies    • Nephrolithi • Colon Polyps Father    • Stroke Father       reports that he has never smoked. He has never used smokeless tobacco. He reports current alcohol use. He reports that he does not use drugs.     Allergies:    Bananas                   Beef-Derived Produc* Regular rate and rhythm. NEUROLOGICAL:  This patient is alert and orientated x 3. Speech fluent. Able to follow simple commands. Face is symmetrical.  No Drift. Pupils equally round and reactive to light. 3+ brisk bilaterally. EOMs intact.   Visual fi to discharge home. Advised him to follow up in the clinic if patient recurs . Rest of the management per primary care. Thank you for allowing us to participate in your patient's care. Please do not hesitate to call if you have any questions.      Cara

## 2020-12-23 NOTE — SLP NOTE
ADULT SWALLOWING EVALUATION    ASSESSMENT    ASSESSMENT/OVERALL IMPRESSION:  Orders were received for a bedside swallowing evaluation per stroke protocol. Patient sitting up in bed when this writer arrived.  Patient admitted on 12/22 with left facial and le cold) 6/29/2012   • Allergic rhinitis 10/07   • Bloating     Food allergies   • Calculus of kidney 2003   • Conjunctivitis 2/10   • Diarrhea 9/11   • E coli infection 2/06   • Eczema 2/08   • EE (eosinophilic esophagitis) 8/95/0857   • Enlargement of lymph The findings of this critical value study were discussed with the ordering physician,           SUBJECTIVE       OBJECTIVE   ORAL MOTOR EXAMINATION  Dentition: Natural;Functional  Symmetry: Within Functional Limits  Strength:  Within Functional Limits  Tone

## 2020-12-23 NOTE — PLAN OF CARE
Assumed care at 1. AOx4. Still having some numbness on left side per pt. CT of abdomen & pelvis done. Awaiting for MRI/MRA. Plan of care discussed with pt. Call light within reach.

## 2020-12-24 ENCOUNTER — PATIENT OUTREACH (OUTPATIENT)
Dept: CASE MANAGEMENT | Age: 45
End: 2020-12-24

## 2020-12-24 DIAGNOSIS — Z02.9 ENCOUNTERS FOR ADMINISTRATIVE PURPOSE: ICD-10-CM

## 2020-12-24 PROCEDURE — 1111F DSCHRG MED/CURRENT MED MERGE: CPT

## 2020-12-24 NOTE — PROGRESS NOTES
Initial Post Discharge Follow Up   Discharge Date: 12/23/20  Contact Date: 12/24/2020    Consent Verification:  Assessment Completed With: Patient  HIPAA Verified?   Yes    Discharge Dx:   Left sided numbness    Was TCC ordered: no      General:   • How (four) hours as needed for Wheezing. 79 Container 0     • (NCM)  Were there any medication changes noted on AVS?  no  • May I go over your medications with you to make sure we are not missing anything? yes  • Are there any reasons that keep you from taking

## 2020-12-27 NOTE — DISCHARGE SUMMARY
[facility]  DISCHARGE SUMMARY     Delaney Rodriguez Patient Status:  Observation    1975 MRN [id number]   [facility] 7NE-A Attending Kurtis Zimmerman, [address] Day # 0 PCP Filemon Strickland DO     Date of Admission: 2020  Date of Jhonny Dunn Low Risk         TCM Follow-Up Recommendation:  LACE 29-58:  Moderate Risk of readmission after discharge from the hospital.      Consultants:  • neuro    Discharge Medication List:     Discharge Medications      CONTINUE taking these medications      Inst Patient Instructions:                         Vital signs:       Physical Exam:    General: No acute distress. Respiratory: Clear to auscultation bilaterally. No wheezes. No rhonchi. Cardiovascular: S1, S2.  No rubs, no JVD  Abdomen: Soft, nontender, non

## 2020-12-30 ENCOUNTER — OFFICE VISIT (OUTPATIENT)
Dept: FAMILY MEDICINE CLINIC | Facility: CLINIC | Age: 45
End: 2020-12-30
Payer: COMMERCIAL

## 2020-12-30 VITALS
HEIGHT: 68 IN | RESPIRATION RATE: 16 BRPM | WEIGHT: 172 LBS | OXYGEN SATURATION: 98 % | HEART RATE: 66 BPM | DIASTOLIC BLOOD PRESSURE: 74 MMHG | BODY MASS INDEX: 26.07 KG/M2 | SYSTOLIC BLOOD PRESSURE: 120 MMHG

## 2020-12-30 DIAGNOSIS — R20.0 NUMBNESS AND TINGLING OF LEFT LOWER EXTREMITY: Primary | ICD-10-CM

## 2020-12-30 DIAGNOSIS — R20.2 NUMBNESS AND TINGLING OF LEFT LOWER EXTREMITY: Primary | ICD-10-CM

## 2020-12-30 PROCEDURE — 3074F SYST BP LT 130 MM HG: CPT | Performed by: FAMILY MEDICINE

## 2020-12-30 PROCEDURE — 3008F BODY MASS INDEX DOCD: CPT | Performed by: FAMILY MEDICINE

## 2020-12-30 PROCEDURE — 99496 TRANSJ CARE MGMT HIGH F2F 7D: CPT | Performed by: FAMILY MEDICINE

## 2020-12-30 PROCEDURE — 3078F DIAST BP <80 MM HG: CPT | Performed by: FAMILY MEDICINE

## 2020-12-30 RX ORDER — ESCITALOPRAM OXALATE 10 MG/1
15 TABLET ORAL DAILY
COMMUNITY

## 2020-12-30 NOTE — PROGRESS NOTES
HPI:    Loni is a 39year old male here today for hospital follow up.    He was discharged from Inpatient hospital, [facility] to Home   Admission Date: 12/22/20   Discharge Date: 12/23/20  Hospital Discharge Diagnoses (since 11/30/2020)   No breakfast.    •  Fluticasone Propionate 50 MCG/ACT Nasal Suspension, 2 sprays by Each Nare route daily. •  ibuprofen 200 MG Oral Tab, Take 400 mg by mouth every 6 (six) hours as needed for Pain or Fever.     •  buPROPion HCl ER, XL, 300 MG Oral Tablet 24 node(s) (9/09). He family history includes Cancer in his maternal grandfather; Colon Polyps in his father; Other (age of onset: 76) in his father; Stroke in his father; hem cancer in an other family member. He  reports that he has never smoked.  He has of left leg to vibration and to cold. Consistent with L5 nerve area      ASSESSMENT/ PLAN:   There are no diagnoses linked to this encounter. No orders of the defined types were placed in this encounter.       Meds & Refills for this Visit:  Requested P

## 2021-04-01 DIAGNOSIS — Z23 NEED FOR VACCINATION: ICD-10-CM

## 2021-06-10 ENCOUNTER — HOSPITAL ENCOUNTER (OUTPATIENT)
Dept: GENERAL RADIOLOGY | Age: 46
Discharge: HOME | End: 2021-06-10
Attending: FAMILY MEDICINE
Payer: COMMERCIAL

## 2021-06-10 ENCOUNTER — OFFICE VISIT (OUTPATIENT)
Dept: FAMILY MEDICINE CLINIC | Facility: CLINIC | Age: 46
End: 2021-06-10
Payer: COMMERCIAL

## 2021-06-10 ENCOUNTER — LAB ENCOUNTER (OUTPATIENT)
Dept: LAB | Age: 46
End: 2021-06-10
Attending: FAMILY MEDICINE
Payer: COMMERCIAL

## 2021-06-10 VITALS
HEART RATE: 67 BPM | RESPIRATION RATE: 18 BRPM | OXYGEN SATURATION: 98 % | HEIGHT: 68 IN | BODY MASS INDEX: 28.19 KG/M2 | SYSTOLIC BLOOD PRESSURE: 108 MMHG | WEIGHT: 186 LBS | DIASTOLIC BLOOD PRESSURE: 74 MMHG

## 2021-06-10 DIAGNOSIS — Z00.00 GENERAL MEDICAL EXAMINATION: ICD-10-CM

## 2021-06-10 DIAGNOSIS — Z00.00 GENERAL MEDICAL EXAM: ICD-10-CM

## 2021-06-10 DIAGNOSIS — M79.10 MUSCLE DISCOMFORT: Primary | ICD-10-CM

## 2021-06-10 DIAGNOSIS — G47.33 OSA (OBSTRUCTIVE SLEEP APNEA): ICD-10-CM

## 2021-06-10 DIAGNOSIS — M54.2 NECK PAIN: ICD-10-CM

## 2021-06-10 DIAGNOSIS — K20.0 EE (EOSINOPHILIC ESOPHAGITIS): ICD-10-CM

## 2021-06-10 PROCEDURE — 72050 X-RAY EXAM NECK SPINE 4/5VWS: CPT | Performed by: FAMILY MEDICINE

## 2021-06-10 PROCEDURE — 80050 GENERAL HEALTH PANEL: CPT | Performed by: FAMILY MEDICINE

## 2021-06-10 PROCEDURE — 3074F SYST BP LT 130 MM HG: CPT | Performed by: FAMILY MEDICINE

## 2021-06-10 PROCEDURE — 86038 ANTINUCLEAR ANTIBODIES: CPT | Performed by: FAMILY MEDICINE

## 2021-06-10 PROCEDURE — 82550 ASSAY OF CK (CPK): CPT | Performed by: FAMILY MEDICINE

## 2021-06-10 PROCEDURE — 3008F BODY MASS INDEX DOCD: CPT | Performed by: FAMILY MEDICINE

## 2021-06-10 PROCEDURE — 80061 LIPID PANEL: CPT | Performed by: FAMILY MEDICINE

## 2021-06-10 PROCEDURE — 84425 ASSAY OF VITAMIN B-1: CPT | Performed by: FAMILY MEDICINE

## 2021-06-10 PROCEDURE — 83735 ASSAY OF MAGNESIUM: CPT | Performed by: FAMILY MEDICINE

## 2021-06-10 PROCEDURE — 3078F DIAST BP <80 MM HG: CPT | Performed by: FAMILY MEDICINE

## 2021-06-10 PROCEDURE — 99214 OFFICE O/P EST MOD 30 MIN: CPT | Performed by: FAMILY MEDICINE

## 2021-06-10 PROCEDURE — 82607 VITAMIN B-12: CPT | Performed by: FAMILY MEDICINE

## 2021-06-10 PROCEDURE — 82306 VITAMIN D 25 HYDROXY: CPT | Performed by: FAMILY MEDICINE

## 2021-06-10 PROCEDURE — 85652 RBC SED RATE AUTOMATED: CPT | Performed by: FAMILY MEDICINE

## 2021-06-10 PROCEDURE — 86431 RHEUMATOID FACTOR QUANT: CPT | Performed by: FAMILY MEDICINE

## 2021-06-10 PROCEDURE — 86140 C-REACTIVE PROTEIN: CPT | Performed by: FAMILY MEDICINE

## 2021-06-10 PROCEDURE — 84439 ASSAY OF FREE THYROXINE: CPT | Performed by: FAMILY MEDICINE

## 2021-06-10 RX ORDER — PANTOPRAZOLE SODIUM 40 MG/1
40 TABLET, DELAYED RELEASE ORAL
Qty: 180 TABLET | Refills: 1 | Status: SHIPPED | OUTPATIENT
Start: 2021-06-10

## 2021-06-10 NOTE — PROGRESS NOTES
Idris Alexandra is a 42year old male here to discuss muscle changes  HPI:   '  Pt noted 3-4 mo of crackling sensation of all muscles  No pain   No numbness  No weakness  Pt has been working out more recently     Lots of endoscopies last year and wonders if t • Fatigue     Seems to be related to food allergies   • Food allergy     chicken, turkey, eggs causes throat to swell   • Food intolerance     Food Allergies   • Gastroenteritis 20/06   • H/O lymph node biopsy 8/07    numerous mesenteric and retroperiton thyromegaly   Heart: normal   Lungs: clear  Abd: + bs soft NT ND no RRG   HEENT: TM's and OP clear  MUSC: crepitus heard when pt was palpating his neck muscles but unable to reproduce on exam  UE and LE: 5+ strength 2+ DTRs normal sensation throughout

## 2021-08-28 ENCOUNTER — WALK IN (OUTPATIENT)
Dept: URGENT CARE | Age: 46
End: 2021-08-28

## 2021-08-28 VITALS
RESPIRATION RATE: 16 BRPM | BODY MASS INDEX: 28.04 KG/M2 | DIASTOLIC BLOOD PRESSURE: 92 MMHG | HEIGHT: 68 IN | HEART RATE: 88 BPM | SYSTOLIC BLOOD PRESSURE: 134 MMHG | OXYGEN SATURATION: 100 % | TEMPERATURE: 99.9 F | WEIGHT: 185 LBS

## 2021-08-28 DIAGNOSIS — J06.9 VIRAL URI: ICD-10-CM

## 2021-08-28 DIAGNOSIS — R05.9 COUGH: Primary | ICD-10-CM

## 2021-08-28 LAB — SARS-COV+SARS-COV-2 AG RESP QL IA.RAPID: NOT DETECTED

## 2021-08-28 PROCEDURE — 99203 OFFICE O/P NEW LOW 30 MIN: CPT | Performed by: OBSTETRICS & GYNECOLOGY

## 2021-08-28 PROCEDURE — 87426 SARSCOV CORONAVIRUS AG IA: CPT | Performed by: OBSTETRICS & GYNECOLOGY

## 2021-08-28 RX ORDER — ESCITALOPRAM OXALATE 10 MG/1
10 TABLET ORAL DAILY
COMMUNITY

## 2021-08-28 ASSESSMENT — ENCOUNTER SYMPTOMS
SINUS PRESSURE: 0
FATIGUE: 1
CHILLS: 1
COUGH: 1
DIARRHEA: 0
NAUSEA: 1
DIAPHORESIS: 1
HEADACHES: 1
VOMITING: 0

## 2021-09-07 ENCOUNTER — HOSPITAL ENCOUNTER (EMERGENCY)
Facility: HOSPITAL | Age: 46
Discharge: HOME | End: 2021-09-07
Attending: EMERGENCY MEDICINE
Payer: COMMERCIAL

## 2021-09-07 VITALS
BODY MASS INDEX: 28.04 KG/M2 | SYSTOLIC BLOOD PRESSURE: 143 MMHG | HEIGHT: 68 IN | TEMPERATURE: 99 F | OXYGEN SATURATION: 96 % | HEART RATE: 69 BPM | DIASTOLIC BLOOD PRESSURE: 93 MMHG | WEIGHT: 185 LBS | RESPIRATION RATE: 20 BRPM

## 2021-09-07 DIAGNOSIS — S61.512A LACERATION OF LEFT WRIST, INITIAL ENCOUNTER: Primary | ICD-10-CM

## 2021-09-07 PROCEDURE — 99283 EMERGENCY DEPT VISIT LOW MDM: CPT

## 2021-09-07 PROCEDURE — 12001 RPR S/N/AX/GEN/TRNK 2.5CM/<: CPT

## 2021-09-07 PROCEDURE — 90471 IMMUNIZATION ADMIN: CPT

## 2021-09-08 NOTE — ED PROVIDER NOTES
Patient Seen in: [facility] Emergency Department      History   Patient presents with:  Laceration/Abrasion    Stated Complaint: lac to right forarm     HPI/Subjective:   HPI    74-year-old male presents emergency department for accidental laceratio Urinary bladder stone 8/21/09    small stone   • Wheezing     Allergy Related              Past Surgical History:   Procedure Laterality Date   • BIOPSY/REMOVAL, LYMPH NODE(S)  9/09    retroperitoneal messenteric nodes, Negative   • HERNIA SURGERY     • NE Physical Exam  Vitals and nursing note reviewed. Constitutional:       Appearance: Normal appearance. HENT:      Head: Normocephalic.       Right Ear: External ear normal.      Left Ear: External ear normal.   Pulmonary:      Effort: Pulmonary e Medication List

## 2021-10-05 ENCOUNTER — APPOINTMENT (OUTPATIENT)
Dept: CT IMAGING | Facility: HOSPITAL | Age: 46
End: 2021-10-05
Payer: COMMERCIAL

## 2021-10-05 ENCOUNTER — HOSPITAL ENCOUNTER (EMERGENCY)
Facility: HOSPITAL | Age: 46
Discharge: HOME | End: 2021-10-05
Payer: COMMERCIAL

## 2021-10-05 VITALS
OXYGEN SATURATION: 98 % | TEMPERATURE: 96 F | BODY MASS INDEX: 28.04 KG/M2 | RESPIRATION RATE: 16 BRPM | HEART RATE: 70 BPM | WEIGHT: 185 LBS | SYSTOLIC BLOOD PRESSURE: 144 MMHG | HEIGHT: 68 IN | DIASTOLIC BLOOD PRESSURE: 88 MMHG

## 2021-10-05 DIAGNOSIS — K63.89 EPIPLOIC APPENDAGITIS: Primary | ICD-10-CM

## 2021-10-05 PROCEDURE — 80053 COMPREHEN METABOLIC PANEL: CPT

## 2021-10-05 PROCEDURE — 85025 COMPLETE CBC W/AUTO DIFF WBC: CPT

## 2021-10-05 PROCEDURE — 99284 EMERGENCY DEPT VISIT MOD MDM: CPT

## 2021-10-05 PROCEDURE — 74177 CT ABD & PELVIS W/CONTRAST: CPT

## 2021-10-05 PROCEDURE — 81003 URINALYSIS AUTO W/O SCOPE: CPT

## 2021-10-05 PROCEDURE — 83690 ASSAY OF LIPASE: CPT

## 2021-10-05 PROCEDURE — 36415 COLL VENOUS BLD VENIPUNCTURE: CPT

## 2021-10-05 RX ORDER — ONDANSETRON 4 MG/1
4 TABLET, ORALLY DISINTEGRATING ORAL EVERY 4 HOURS PRN
Qty: 10 TABLET | Refills: 0 | Status: SHIPPED | OUTPATIENT
Start: 2021-10-05 | End: 2021-10-12

## 2021-10-05 NOTE — ED PROVIDER NOTES
Patient Seen in: [facility] Emergency Department      History   Patient presents with:  Abdomen/Flank Pain    Stated Complaint: RLQ pain that started last night    Subjective:   HPI    30-year-old male presents reporting pain to the right lower quad • Shortness of breath     Allergy Related   • Sleep apnea    • Sleep disturbance    • Staph infection 8/20/09    of wound on the leg with cellulitis.  s/p I & D   • Streptococcal sore throat 6/29/2012   • Urinary bladder stone 8/21/09    small stone   • W Alert oriented and nonfocal   Skin: no rashes or nodules    ED Course     Labs Reviewed   COMP METABOLIC PANEL (14) - Normal   LIPASE - Normal   CBC WITH DIFFERENTIAL WITH PLATELET    Narrative:      The following orders were created for panel order CBC Armando Pickens on axial series 2, image 129 and coronal series 4, image 27. This has imaging appearance most consistent with focal epiploic appendagitis or possibly a small omental infarct.   The adjacent cecum appears otherwise normal. LIVER:  Scattered 2-4 mm foci of l shows some stranding of a fat density in the right lower quadrant near the tip of the cecum that is most consistent with epiploic appendagitis.   The appendix is visualized and is distinct from this area of inflammation and there is no evidence of acute gt

## 2021-10-05 NOTE — ED INITIAL ASSESSMENT (HPI)
Pt states gradual generalized abdominal pain that spread to RLQ that started last night. Pt states feeling nauseous. Pt denies urinary symptoms.

## 2021-10-06 ENCOUNTER — OFFICE VISIT (OUTPATIENT)
Dept: FAMILY MEDICINE CLINIC | Facility: CLINIC | Age: 46
End: 2021-10-06
Payer: COMMERCIAL

## 2021-10-06 VITALS
SYSTOLIC BLOOD PRESSURE: 118 MMHG | DIASTOLIC BLOOD PRESSURE: 72 MMHG | HEART RATE: 68 BPM | HEIGHT: 68 IN | RESPIRATION RATE: 16 BRPM | BODY MASS INDEX: 29.86 KG/M2 | WEIGHT: 197 LBS | OXYGEN SATURATION: 98 %

## 2021-10-06 DIAGNOSIS — K20.0 EE (EOSINOPHILIC ESOPHAGITIS): ICD-10-CM

## 2021-10-06 DIAGNOSIS — K21.00 GASTROESOPHAGEAL REFLUX DISEASE WITH ESOPHAGITIS WITHOUT HEMORRHAGE: ICD-10-CM

## 2021-10-06 DIAGNOSIS — Z91.018 NUT ALLERGY: ICD-10-CM

## 2021-10-06 DIAGNOSIS — G47.09 OTHER INSOMNIA: ICD-10-CM

## 2021-10-06 DIAGNOSIS — K63.89 EPIPLOIC APPENDAGITIS: Primary | ICD-10-CM

## 2021-10-06 PROCEDURE — 3078F DIAST BP <80 MM HG: CPT | Performed by: FAMILY MEDICINE

## 2021-10-06 PROCEDURE — 90471 IMMUNIZATION ADMIN: CPT | Performed by: FAMILY MEDICINE

## 2021-10-06 PROCEDURE — 3008F BODY MASS INDEX DOCD: CPT | Performed by: FAMILY MEDICINE

## 2021-10-06 PROCEDURE — 3074F SYST BP LT 130 MM HG: CPT | Performed by: FAMILY MEDICINE

## 2021-10-06 PROCEDURE — 90674 CCIIV4 VAC NO PRSV 0.5 ML IM: CPT | Performed by: FAMILY MEDICINE

## 2021-10-06 PROCEDURE — 99214 OFFICE O/P EST MOD 30 MIN: CPT | Performed by: FAMILY MEDICINE

## 2021-10-06 RX ORDER — SUCRALFATE 1 G/1
1 TABLET ORAL
Qty: 120 TABLET | Refills: 1 | Status: SHIPPED | OUTPATIENT
Start: 2021-10-06 | End: 2022-10-01

## 2021-10-06 RX ORDER — IBUPROFEN 800 MG/1
800 TABLET ORAL EVERY 8 HOURS PRN
Qty: 60 TABLET | Refills: 0 | Status: SHIPPED | OUTPATIENT
Start: 2021-10-06

## 2021-10-06 RX ORDER — TRAZODONE HYDROCHLORIDE 50 MG/1
50 TABLET ORAL NIGHTLY PRN
Qty: 90 TABLET | Refills: 0 | Status: SHIPPED | OUTPATIENT
Start: 2021-10-06 | End: 2022-01-02

## 2021-10-06 RX ORDER — EPINEPHRINE 0.3 MG/.3ML
0.3 INJECTION SUBCUTANEOUS AS NEEDED
Qty: 1 EACH | Refills: 1 | Status: SHIPPED | OUTPATIENT
Start: 2021-10-06

## 2021-10-06 NOTE — PROGRESS NOTES
Michael Palencia is a 42year old male here to discuss ER follow up for abdominal pain   HPI:     Pt has EoE - now following diet / on PPI   Not working out as much   Has gained weight back     RLQ pain - started a weeks ago / worse yesterday   Feels bloated image 54 and series 2, image 72.    BILIARY:  No visible dilatation or calcification.     PANCREAS:  No lesion, fluid collection, ductal dilatation, or atrophy.     SPLEEN:  No enlargement or focal lesion.     KIDNEYS:  No mass, obstruction, or calcificatio Each Nare route daily. • ibuprofen 200 MG Oral Tab Take 400 mg by mouth every 6 (six) hours as needed for Pain or Fever. • buPROPion HCl ER, XL, 300 MG Oral Tablet 24 Hr Take 300 mg by mouth every morning.      • Albuterol Sulfate HFA (VENTOLIN HFA) swelling/pain   • Shortness of breath     Allergy Related   • Sleep apnea    • Sleep disturbance    • Staph infection 8/20/09    of wound on the leg with cellulitis.  s/p I & D   • Streptococcal sore throat 6/29/2012   • Urinary bladder stone 8/21/09    sma Take 1 tablet (50 mg total) by mouth nightly as needed for Sleep. Dispense: 90 tablet; Refill: 0    5. Nut allergy    - EPINEPHrine (EPIPEN 2-MEGGAN) 0.3 MG/0.3ML Injection Solution Auto-injector; Inject 0.3 mL (1 each total) as directed as needed.   Dispense

## 2021-11-10 ENCOUNTER — APPOINTMENT (OUTPATIENT)
Dept: CT IMAGING | Facility: HOSPITAL | Age: 46
End: 2021-11-10
Attending: EMERGENCY MEDICINE
Payer: COMMERCIAL

## 2021-11-10 ENCOUNTER — HOSPITAL ENCOUNTER (OUTPATIENT)
Age: 46
Discharge: ANOTHER HOSPITAL | End: 2021-11-10
Payer: COMMERCIAL

## 2021-11-10 ENCOUNTER — HOSPITAL ENCOUNTER (EMERGENCY)
Facility: HOSPITAL | Age: 46
Discharge: HOME | End: 2021-11-10
Attending: EMERGENCY MEDICINE
Payer: COMMERCIAL

## 2021-11-10 VITALS
OXYGEN SATURATION: 97 % | BODY MASS INDEX: 28.04 KG/M2 | SYSTOLIC BLOOD PRESSURE: 139 MMHG | TEMPERATURE: 99 F | WEIGHT: 185 LBS | RESPIRATION RATE: 16 BRPM | HEART RATE: 74 BPM | DIASTOLIC BLOOD PRESSURE: 89 MMHG | HEIGHT: 68 IN

## 2021-11-10 VITALS
OXYGEN SATURATION: 95 % | HEART RATE: 63 BPM | DIASTOLIC BLOOD PRESSURE: 86 MMHG | RESPIRATION RATE: 18 BRPM | BODY MASS INDEX: 28.04 KG/M2 | TEMPERATURE: 98 F | HEIGHT: 68 IN | SYSTOLIC BLOOD PRESSURE: 129 MMHG | WEIGHT: 185 LBS

## 2021-11-10 DIAGNOSIS — R19.7 DIARRHEA, UNSPECIFIED TYPE: ICD-10-CM

## 2021-11-10 DIAGNOSIS — R19.7 DIARRHEA, UNSPECIFIED TYPE: Primary | ICD-10-CM

## 2021-11-10 DIAGNOSIS — R10.9 ABDOMINAL PAIN OF UNKNOWN ETIOLOGY: Primary | ICD-10-CM

## 2021-11-10 PROCEDURE — 80053 COMPREHEN METABOLIC PANEL: CPT

## 2021-11-10 PROCEDURE — 80053 COMPREHEN METABOLIC PANEL: CPT | Performed by: EMERGENCY MEDICINE

## 2021-11-10 PROCEDURE — 99284 EMERGENCY DEPT VISIT MOD MDM: CPT

## 2021-11-10 PROCEDURE — 85025 COMPLETE CBC W/AUTO DIFF WBC: CPT

## 2021-11-10 PROCEDURE — 74177 CT ABD & PELVIS W/CONTRAST: CPT | Performed by: EMERGENCY MEDICINE

## 2021-11-10 PROCEDURE — 96360 HYDRATION IV INFUSION INIT: CPT

## 2021-11-10 PROCEDURE — 96361 HYDRATE IV INFUSION ADD-ON: CPT

## 2021-11-10 PROCEDURE — 81002 URINALYSIS NONAUTO W/O SCOPE: CPT | Performed by: PHYSICIAN ASSISTANT

## 2021-11-10 PROCEDURE — 85025 COMPLETE CBC W/AUTO DIFF WBC: CPT | Performed by: EMERGENCY MEDICINE

## 2021-11-10 PROCEDURE — 83690 ASSAY OF LIPASE: CPT | Performed by: EMERGENCY MEDICINE

## 2021-11-10 PROCEDURE — 99215 OFFICE O/P EST HI 40 MIN: CPT | Performed by: PHYSICIAN ASSISTANT

## 2021-11-10 PROCEDURE — 81003 URINALYSIS AUTO W/O SCOPE: CPT | Performed by: EMERGENCY MEDICINE

## 2021-11-10 NOTE — ED INITIAL ASSESSMENT (HPI)
4 days of intermittent abdominal cramping pain- to mid abdomen  Nauseated  Denies any emesis  Patient having loose stool- states looks dark  Poor fluid and food intake

## 2021-11-10 NOTE — ED INITIAL ASSESSMENT (HPI)
Pt arrives with complaints of abdominal cramping and diarrhea for the past 3 days. Pt states that he has not had any emesis, but has had nausea. Pt denies any blood in stool.

## 2021-11-10 NOTE — ED PROVIDER NOTES
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 Head: Normocephalic and atraumatic. Cardiovascular: Normal rate, regular rhythm, normal heart sounds and intact distal pulses.     Pulmonary/Chest: Effort normal and breath sounds nor Disposition and Plan     Clinical Impression:  Abdominal pain of unknown etiology  (primary encounter diagnosis)  Diarrhea, unspecified type     Disposition:   Ic to ed  11/10/2021 11:51 am    Follow-up:  [facility] Emergency Department

## 2021-11-10 NOTE — ED PROVIDER NOTES
Patient Seen in: [facility] Emergency Department      History   Patient presents with:  Abdomen/Flank Pain  Nausea/Vomiting/Diarrhea    Stated Complaint: abdominal pain, nausea, diarrhea.     Subjective:   HPI    28-year-old male presents today for e disturbance    • Staph infection 8/20/09    of wound on the leg with cellulitis.  s/p I & D   • Streptococcal sore throat 6/29/2012   • Urinary bladder stone 8/21/09    small stone   • Wheezing     Allergy Related              Past Surgical History:   Proce Pulmonary:      Effort: Pulmonary effort is normal.      Breath sounds: Normal breath sounds. Abdominal:      Palpations: Abdomen is soft. Tenderness: There is abdominal tenderness in the right upper quadrant and right lower quadrant.    Musculoske were used. Dose information is transmitted to the [facility]) NR[facility] ([address]) which includes the Dose Index Registry.   PATIENT STATED HISTORY:(As transcribed by Technologist)  Patient with abdominal cramping Dictated by (CST): Nidia Meckel, MD on 11/10/2021 at [address] by (CST): Nidia Meckel, MD on 11/10/2021 at 6:23 PM              MDM      70-year-old male presents today for evaluation of loose stools along with the right side abdominal pain.   An

## 2021-11-11 ENCOUNTER — LAB ENCOUNTER (OUTPATIENT)
Dept: LAB | Facility: HOSPITAL | Age: 46
End: 2021-11-11
Payer: COMMERCIAL

## 2021-11-11 DIAGNOSIS — R19.7 DIARRHEA, UNSPECIFIED TYPE: ICD-10-CM

## 2021-11-11 PROCEDURE — 87507 IADNA-DNA/RNA PROBE TQ 12-25: CPT | Performed by: EMERGENCY MEDICINE

## 2021-11-11 PROCEDURE — 87427 SHIGA-LIKE TOXIN AG IA: CPT

## 2021-11-11 PROCEDURE — 87493 C DIFF AMPLIFIED PROBE: CPT | Performed by: EMERGENCY MEDICINE

## 2021-11-11 PROCEDURE — 87046 STOOL CULTR AEROBIC BACT EA: CPT

## 2021-11-11 PROCEDURE — 87045 FECES CULTURE AEROBIC BACT: CPT

## 2021-11-11 PROCEDURE — 87493 C DIFF AMPLIFIED PROBE: CPT

## 2021-11-11 PROCEDURE — 87077 CULTURE AEROBIC IDENTIFY: CPT

## 2021-12-31 DIAGNOSIS — G47.09 OTHER INSOMNIA: ICD-10-CM

## 2022-01-02 RX ORDER — TRAZODONE HYDROCHLORIDE 50 MG/1
TABLET ORAL
Qty: 90 TABLET | Refills: 0 | Status: SHIPPED | OUTPATIENT
Start: 2022-01-02

## 2022-05-23 DIAGNOSIS — G47.09 OTHER INSOMNIA: ICD-10-CM

## 2022-05-23 RX ORDER — TRAZODONE HYDROCHLORIDE 50 MG/1
50 TABLET ORAL NIGHTLY PRN
Qty: 10 TABLET | Refills: 0 | Status: SHIPPED | OUTPATIENT
Start: 2022-05-23

## 2022-09-22 DIAGNOSIS — Z12.11 ENCOUNTER FOR SCREENING COLONOSCOPY: Primary | ICD-10-CM

## 2022-12-16 PROBLEM — Z12.11 SPECIAL SCREENING FOR MALIGNANT NEOPLASM OF COLON: Status: ACTIVE | Noted: 2022-12-16

## 2023-01-26 ENCOUNTER — TELEPHONE (OUTPATIENT)
Dept: FAMILY MEDICINE CLINIC | Facility: CLINIC | Age: 48
End: 2023-01-26

## 2023-01-26 DIAGNOSIS — F32.A ANXIETY AND DEPRESSION: ICD-10-CM

## 2023-01-26 DIAGNOSIS — F41.9 ANXIETY AND DEPRESSION: ICD-10-CM

## 2023-01-26 RX ORDER — ALPRAZOLAM 0.5 MG/1
0.5 TABLET ORAL NIGHTLY PRN
Qty: 30 TABLET | Refills: 0 | Status: SHIPPED | OUTPATIENT
Start: 2023-01-26

## 2023-03-29 DIAGNOSIS — F41.9 ANXIETY AND DEPRESSION: ICD-10-CM

## 2023-03-29 DIAGNOSIS — F32.A ANXIETY AND DEPRESSION: ICD-10-CM

## 2023-03-29 RX ORDER — BUPROPION HYDROCHLORIDE 300 MG/1
300 TABLET ORAL DAILY
Qty: 30 TABLET | Refills: 0 | Status: SHIPPED | OUTPATIENT
Start: 2023-03-29

## 2023-12-19 ENCOUNTER — PATIENT MESSAGE (OUTPATIENT)
Dept: FAMILY MEDICINE CLINIC | Facility: CLINIC | Age: 48
End: 2023-12-19

## 2023-12-19 ENCOUNTER — TELEMEDICINE (OUTPATIENT)
Dept: FAMILY MEDICINE CLINIC | Facility: CLINIC | Age: 48
End: 2023-12-19
Payer: COMMERCIAL

## 2023-12-19 DIAGNOSIS — U07.1 COVID-19 VIRUS INFECTION: Primary | ICD-10-CM

## 2024-02-14 NOTE — TELEPHONE ENCOUNTER
DONE
From: Loni  Sent: 12/19/2023 11:54 AM CST  To: [facility] Clinical Staff  Subject: [id number] card
Negative

## (undated) DEVICE — 1200CC GUARDIAN II: Brand: GUARDIAN

## (undated) DEVICE — FILTERLINE NASAL ADULT O2/CO2

## (undated) DEVICE — FORCEP BIOPSY RJ4 LG CAP W/ND

## (undated) DEVICE — Device: Brand: DEFENDO AIR/WATER/SUCTION AND BIOPSY VALVE

## (undated) DEVICE — KENDALL SCD EXPRESS SLEEVES, KNEE LENGTH, MEDIUM: Brand: KENDALL SCD

## (undated) DEVICE — ENDOSCOPY PACK UPPER: Brand: MEDLINE INDUSTRIES, INC.

## (undated) DEVICE — 3M™ RED DOT™ MONITORING ELECTRODE WITH FOAM TAPE AND STICKY GEL, 50/BAG, 20/CASE, 72/PLT 2570: Brand: RED DOT™

## (undated) NOTE — LETTER
María Elena Cota 182 6 13[facility]  Luis Alfredo, [address] [place] [address]    Consent for Operation  Date: __________________                                Time: _______________    1.  I authorize the performance upon Loni the following operation:  Procedure(s) procedure has been videotaped, the surgeon will obtain the original videotape. The hospital will not be responsible for storage or maintenance of this tape.   7. For the purpose of advancing medical education, I consent to the admittance of observers to the STATEMENTS REQUIRING INSERTION OR COMPLETION WERE FILLED IN.     Signature of Patient:   ___________________________    When the patient is a minor or mentally incompetent to give consent:  Signature of person authorized to consent for patient: ____________ drugs/illegal medications). Failure to inform my anesthesiologist about these medicines may increase my risk of anesthetic complications. iv. If I am allergic to anything or have had a reaction to anesthesia before.   3. I understand how the anesthesia med I have discussed the procedure and information above with the patient (or patient’s representative) and answered their questions. The patient or their representative has agreed to have anesthesia services.     _______________________________________________

## (undated) NOTE — LETTER
María Elena Cota 182 6 13[facility]  Luis Alfredo, [address] [place] [address]    Consent for Operation  Date: __________________                                Time: _______________    1.  I authorize the performance upon Loni the following operation:  Procedure(s) procedure has been videotaped, the surgeon will obtain the original videotape. The hospital will not be responsible for storage or maintenance of this tape.     6. For the purpose of advancing medical education, I consent to the admittance of observers to t STATEMENTS REQUIRING INSERTION OR COMPLETION WERE FILLED IN.     Signature of Patient:   ___________________________    When the patient is a minor or mentally incompetent to give consent:  Signature of person authorized to consent for patient: ____________

## (undated) NOTE — MR AVS SNAPSHOT
[address]  [address] [address] [phone]               Thank you for choosing us for your health care visit with Mali Wisdom DO.   We are glad to serve you and happy to provide you with this samuels omeprazole 20 MG Cpdr   TAKE 1 CAPSULE BY MOUTH EVERY DAY 30 MINUTES PRIOR TO BREAKFAST   Commonly known as:  PRILOSEC                Where to Get Your Medications      These medications were sent to [facility] [address], [address]  Don’t eat while distracted and slow down. Avoid over sized portions. Don’t eat while when you’re bored.      EAT THESE FOODS MORE OFTEN: EAT THESE FOODS LESS OFTEN:   Make half your plate fruits and vegetables Highly refined, white starches including wh

## (undated) NOTE — LETTER
[facility] [address], [address] [facility]    Consent for Anesthesia   1.    Oliver Jones agree to be cared for by a physician anesthesiologist alone and/or with a nurse anesthetist, who is specially trained to monitor me and give me med allergic reactions to medications, injury to my airway, heart, lungs, vision, nerves, or muscles and in extremely rare instances death. 5. My doctor has explained to me other choices available to me for my care (alternatives).   6. Pregnant Patients (“epid Printed: 4/10/2020 at 9:28 PM    Medical Record #: [id number]                                            Page 1 of 1

## (undated) NOTE — MR AVS SNAPSHOT
[address]  [address] [address] [phone]               Thank you for choosing us for your health care visit with Johanna Cowden, DO.   We are glad to serve you and happy to provide you with this samuels escitalopram 10 MG Tabs   Take 1 tablet (10 mg total) by mouth once daily.    Commonly known as:  LEXAPRO           omeprazole 20 MG Cpdr   TAKE 1 CAPSULE BY MOUTH EVERY DAY 30 MINUTES PRIOR TO BREAKFAST   Commonly known as:  PRILOSEC                Whe

## (undated) NOTE — MR AVS SNAPSHOT
[address]  [address] [place] [address]  [phone]               Thank you for choosing us for your health care visit with Sharon Jones, [address].   We are glad to serve you and happy to provide you with this Commonly known as:  VENTOLIN           alprazolam 0.5 MG Tabs   Take 1 tablet (0.5 mg total) by mouth nightly as needed for Sleep or Anxiety.    Commonly known as:  XANAX           azithromycin 250 MG Tabs   Take two tablets by mouth today, then one tablet discharge instructions in [employer] by going to Visits < Admission Summaries. If you've been to the Emergency Department or your doctor's office, you can view your past visit information in [employer] by going to Visits < Visit Summaries. [employer] questions?

## (undated) NOTE — LETTER
María Elena Cota 182 6 13[facility]  [address] [place] [address], [address] [place] [address]    Consent for Operation  Date: __________________                                Time: _______________    1.  I authorize the performance upon Loni the following operation:  Procedure(s) procedure has been videotaped, the surgeon will obtain the original videotape. The hospital will not be responsible for storage or maintenance of this tape.   7. For the purpose of advancing medical education, I consent to the admittance of observers to the STATEMENTS REQUIRING INSERTION OR COMPLETION WERE FILLED IN.     Signature of Patient:   ___________________________    When the patient is a minor or mentally incompetent to give consent:  Signature of person authorized to consent for patient: ____________ drugs/illegal medications). Failure to inform my anesthesiologist about these medicines may increase my risk of anesthetic complications. iv. If I am allergic to anything or have had a reaction to anesthesia before.   3. I understand how the anesthesia med I have discussed the procedure and information above with the patient (or patient’s representative) and answered their questions. The patient or their representative has agreed to have anesthesia services.     _______________________________________________

## (undated) NOTE — LETTER
María Elena Cota 182 6 13[facility]  Luis Alfredo, [address] [place] [address]    Consent for Operation  Date: __________________                                Time: _______________    1.  I authorize the performance upon Loni the following operation:  Procedure(s) procedure has been videotaped, the surgeon will obtain the original videotape. The hospital will not be responsible for storage or maintenance of this tape.   7. For the purpose of advancing medical education, I consent to the admittance of observers to the STATEMENTS REQUIRING INSERTION OR COMPLETION WERE FILLED IN.     Signature of Patient:   ___________________________    When the patient is a minor or mentally incompetent to give consent:  Signature of person authorized to consent for patient: ____________ drugs/illegal medications). Failure to inform my anesthesiologist about these medicines may increase my risk of anesthetic complications. iv. If I am allergic to anything or have had a reaction to anesthesia before.   3. I understand how the anesthesia med I have discussed the procedure and information above with the patient (or patient’s representative) and answered their questions. The patient or their representative has agreed to have anesthesia services.     _______________________________________________

## (undated) NOTE — MR AVS SNAPSHOT
[address]  [address] [facility], [address] [address] [phone]               Thank you for choosing us for your health care visit with Janeen Christina DO.   We are glad to serve you and happy to provide you with this samuels [facility] [phone]    [place], [facility]         [facility]:  [address] [facility] ([place], [place]) in [facility] in [address], [address] [place] [address]: [phone] Take 1 capsule (300 mg total) by mouth 2 (two) times daily. Commonly known as:  OMNICEF           EPIPEN 2-MEGGAN 0.3 MG/0.3ML Soaj   Generic drug:  EPINEPHrine           escitalopram 10 MG Tabs   Take 1 tablet (10 mg total) by mouth once daily.    Commonly discharge instructions in [employer] by going to Visits < Admission Summaries. If you've been to the Emergency Department or your doctor's office, you can view your past visit information in [employer] by going to Visits < Visit Summaries. [employer] questions?